# Patient Record
Sex: FEMALE | Race: WHITE | NOT HISPANIC OR LATINO
[De-identification: names, ages, dates, MRNs, and addresses within clinical notes are randomized per-mention and may not be internally consistent; named-entity substitution may affect disease eponyms.]

---

## 2018-08-12 ENCOUNTER — TRANSCRIPTION ENCOUNTER (OUTPATIENT)
Age: 73
End: 2018-08-12

## 2019-12-05 ENCOUNTER — TRANSCRIPTION ENCOUNTER (OUTPATIENT)
Age: 74
End: 2019-12-05

## 2020-06-20 ENCOUNTER — INPATIENT (INPATIENT)
Facility: HOSPITAL | Age: 75
LOS: 2 days | Discharge: HOME | End: 2020-06-23
Attending: INTERNAL MEDICINE | Admitting: INTERNAL MEDICINE
Payer: COMMERCIAL

## 2020-06-20 ENCOUNTER — TRANSCRIPTION ENCOUNTER (OUTPATIENT)
Age: 75
End: 2020-06-20

## 2020-06-20 VITALS
SYSTOLIC BLOOD PRESSURE: 144 MMHG | RESPIRATION RATE: 18 BRPM | DIASTOLIC BLOOD PRESSURE: 72 MMHG | HEART RATE: 77 BPM | OXYGEN SATURATION: 100 % | TEMPERATURE: 98 F

## 2020-06-20 DIAGNOSIS — Z29.9 ENCOUNTER FOR PROPHYLACTIC MEASURES, UNSPECIFIED: ICD-10-CM

## 2020-06-20 DIAGNOSIS — Z96.649 PRESENCE OF UNSPECIFIED ARTIFICIAL HIP JOINT: Chronic | ICD-10-CM

## 2020-06-20 DIAGNOSIS — L02.611 CUTANEOUS ABSCESS OF RIGHT FOOT: ICD-10-CM

## 2020-06-20 DIAGNOSIS — J30.2 OTHER SEASONAL ALLERGIC RHINITIS: ICD-10-CM

## 2020-06-20 DIAGNOSIS — D16.9 BENIGN NEOPLASM OF BONE AND ARTICULAR CARTILAGE, UNSPECIFIED: Chronic | ICD-10-CM

## 2020-06-20 LAB
ALBUMIN SERPL ELPH-MCNC: 4.1 G/DL — SIGNIFICANT CHANGE UP (ref 3.3–5)
ALP SERPL-CCNC: 98 U/L — SIGNIFICANT CHANGE UP (ref 40–120)
ALT FLD-CCNC: 23 U/L — SIGNIFICANT CHANGE UP (ref 4–33)
ANION GAP SERPL CALC-SCNC: 13 MMO/L — SIGNIFICANT CHANGE UP (ref 7–14)
AST SERPL-CCNC: 18 U/L — SIGNIFICANT CHANGE UP (ref 4–32)
BASE EXCESS BLDV CALC-SCNC: 1.6 MMOL/L — SIGNIFICANT CHANGE UP
BASOPHILS # BLD AUTO: 0.04 K/UL — SIGNIFICANT CHANGE UP (ref 0–0.2)
BASOPHILS NFR BLD AUTO: 0.6 % — SIGNIFICANT CHANGE UP (ref 0–2)
BILIRUB SERPL-MCNC: 0.3 MG/DL — SIGNIFICANT CHANGE UP (ref 0.2–1.2)
BLOOD GAS VENOUS - CREATININE: 0.9 MG/DL — SIGNIFICANT CHANGE UP (ref 0.5–1.3)
BUN SERPL-MCNC: 19 MG/DL — SIGNIFICANT CHANGE UP (ref 7–23)
CALCIUM SERPL-MCNC: 9.6 MG/DL — SIGNIFICANT CHANGE UP (ref 8.4–10.5)
CHLORIDE BLDV-SCNC: 108 MMOL/L — SIGNIFICANT CHANGE UP (ref 96–108)
CHLORIDE SERPL-SCNC: 104 MMOL/L — SIGNIFICANT CHANGE UP (ref 98–107)
CO2 SERPL-SCNC: 24 MMOL/L — SIGNIFICANT CHANGE UP (ref 22–31)
CREAT SERPL-MCNC: 0.97 MG/DL — SIGNIFICANT CHANGE UP (ref 0.5–1.3)
CRP SERPL-MCNC: 59 MG/L — HIGH
EOSINOPHIL # BLD AUTO: 0.21 K/UL — SIGNIFICANT CHANGE UP (ref 0–0.5)
EOSINOPHIL NFR BLD AUTO: 3 % — SIGNIFICANT CHANGE UP (ref 0–6)
ERYTHROCYTE [SEDIMENTATION RATE] IN BLOOD: 55 MM/HR — HIGH (ref 4–25)
GAS PNL BLDV: 141 MMOL/L — SIGNIFICANT CHANGE UP (ref 136–146)
GLUCOSE BLDV-MCNC: 115 MG/DL — HIGH (ref 70–99)
GLUCOSE SERPL-MCNC: 120 MG/DL — HIGH (ref 70–99)
HCO3 BLDV-SCNC: 24 MMOL/L — SIGNIFICANT CHANGE UP (ref 20–27)
HCT VFR BLD CALC: 40.8 % — SIGNIFICANT CHANGE UP (ref 34.5–45)
HCT VFR BLDV CALC: 42.3 % — SIGNIFICANT CHANGE UP (ref 34.5–45)
HGB BLD-MCNC: 13.1 G/DL — SIGNIFICANT CHANGE UP (ref 11.5–15.5)
HGB BLDV-MCNC: 13.8 G/DL — SIGNIFICANT CHANGE UP (ref 11.5–15.5)
IMM GRANULOCYTES NFR BLD AUTO: 0.4 % — SIGNIFICANT CHANGE UP (ref 0–1.5)
LACTATE BLDV-MCNC: 1.3 MMOL/L — SIGNIFICANT CHANGE UP (ref 0.5–2)
LYMPHOCYTES # BLD AUTO: 1.31 K/UL — SIGNIFICANT CHANGE UP (ref 1–3.3)
LYMPHOCYTES # BLD AUTO: 18.8 % — SIGNIFICANT CHANGE UP (ref 13–44)
MCHC RBC-ENTMCNC: 29.8 PG — SIGNIFICANT CHANGE UP (ref 27–34)
MCHC RBC-ENTMCNC: 32.1 % — SIGNIFICANT CHANGE UP (ref 32–36)
MCV RBC AUTO: 92.9 FL — SIGNIFICANT CHANGE UP (ref 80–100)
MONOCYTES # BLD AUTO: 0.5 K/UL — SIGNIFICANT CHANGE UP (ref 0–0.9)
MONOCYTES NFR BLD AUTO: 7.2 % — SIGNIFICANT CHANGE UP (ref 2–14)
NEUTROPHILS # BLD AUTO: 4.87 K/UL — SIGNIFICANT CHANGE UP (ref 1.8–7.4)
NEUTROPHILS NFR BLD AUTO: 70 % — SIGNIFICANT CHANGE UP (ref 43–77)
NRBC # FLD: 0 K/UL — SIGNIFICANT CHANGE UP (ref 0–0)
PCO2 BLDV: 48 MMHG — SIGNIFICANT CHANGE UP (ref 41–51)
PH BLDV: 7.36 PH — SIGNIFICANT CHANGE UP (ref 7.32–7.43)
PLATELET # BLD AUTO: 272 K/UL — SIGNIFICANT CHANGE UP (ref 150–400)
PMV BLD: 9.3 FL — SIGNIFICANT CHANGE UP (ref 7–13)
PO2 BLDV: < 24 MMHG — LOW (ref 35–40)
POTASSIUM BLDV-SCNC: 4.4 MMOL/L — SIGNIFICANT CHANGE UP (ref 3.4–4.5)
POTASSIUM SERPL-MCNC: 4.7 MMOL/L — SIGNIFICANT CHANGE UP (ref 3.5–5.3)
POTASSIUM SERPL-SCNC: 4.7 MMOL/L — SIGNIFICANT CHANGE UP (ref 3.5–5.3)
PROT SERPL-MCNC: 7 G/DL — SIGNIFICANT CHANGE UP (ref 6–8.3)
RBC # BLD: 4.39 M/UL — SIGNIFICANT CHANGE UP (ref 3.8–5.2)
RBC # FLD: 12.8 % — SIGNIFICANT CHANGE UP (ref 10.3–14.5)
SAO2 % BLDV: 30.1 % — LOW (ref 60–85)
SARS-COV-2 RNA SPEC QL NAA+PROBE: SIGNIFICANT CHANGE UP
SODIUM SERPL-SCNC: 141 MMOL/L — SIGNIFICANT CHANGE UP (ref 135–145)
WBC # BLD: 6.96 K/UL — SIGNIFICANT CHANGE UP (ref 3.8–10.5)
WBC # FLD AUTO: 6.96 K/UL — SIGNIFICANT CHANGE UP (ref 3.8–10.5)

## 2020-06-20 PROCEDURE — 99223 1ST HOSP IP/OBS HIGH 75: CPT

## 2020-06-20 PROCEDURE — 99284 EMERGENCY DEPT VISIT MOD MDM: CPT

## 2020-06-20 PROCEDURE — 73660 X-RAY EXAM OF TOE(S): CPT | Mod: 26,RT

## 2020-06-20 PROCEDURE — 73720 MRI LWR EXTREMITY W/O&W/DYE: CPT | Mod: 26,RT

## 2020-06-20 RX ORDER — ACETAMINOPHEN 500 MG
650 TABLET ORAL EVERY 6 HOURS
Refills: 0 | Status: DISCONTINUED | OUTPATIENT
Start: 2020-06-20 | End: 2020-06-23

## 2020-06-20 RX ORDER — ENOXAPARIN SODIUM 100 MG/ML
40 INJECTION SUBCUTANEOUS DAILY
Refills: 0 | Status: DISCONTINUED | OUTPATIENT
Start: 2020-06-20 | End: 2020-06-23

## 2020-06-20 RX ORDER — VANCOMYCIN HCL 1 G
1000 VIAL (EA) INTRAVENOUS EVERY 12 HOURS
Refills: 0 | Status: DISCONTINUED | OUTPATIENT
Start: 2020-06-21 | End: 2020-06-21

## 2020-06-20 RX ORDER — CADEXOMER IODINE 0.9 %
1 PADS, MEDICATED (EA) TOPICAL DAILY
Refills: 0 | Status: DISCONTINUED | OUTPATIENT
Start: 2020-06-20 | End: 2020-06-23

## 2020-06-20 RX ORDER — LORATADINE 10 MG/1
10 TABLET ORAL DAILY
Refills: 0 | Status: DISCONTINUED | OUTPATIENT
Start: 2020-06-20 | End: 2020-06-23

## 2020-06-20 RX ORDER — VANCOMYCIN HCL 1 G
1000 VIAL (EA) INTRAVENOUS ONCE
Refills: 0 | Status: COMPLETED | OUTPATIENT
Start: 2020-06-20 | End: 2020-06-20

## 2020-06-20 RX ORDER — ONDANSETRON 8 MG/1
4 TABLET, FILM COATED ORAL EVERY 6 HOURS
Refills: 0 | Status: DISCONTINUED | OUTPATIENT
Start: 2020-06-20 | End: 2020-06-23

## 2020-06-20 RX ORDER — PIPERACILLIN AND TAZOBACTAM 4; .5 G/20ML; G/20ML
3.38 INJECTION, POWDER, LYOPHILIZED, FOR SOLUTION INTRAVENOUS EVERY 8 HOURS
Refills: 0 | Status: DISCONTINUED | OUTPATIENT
Start: 2020-06-21 | End: 2020-06-23

## 2020-06-20 RX ORDER — HYDRALAZINE HCL 50 MG
25 TABLET ORAL ONCE
Refills: 0 | Status: COMPLETED | OUTPATIENT
Start: 2020-06-20 | End: 2020-06-20

## 2020-06-20 RX ORDER — PIPERACILLIN AND TAZOBACTAM 4; .5 G/20ML; G/20ML
3.38 INJECTION, POWDER, LYOPHILIZED, FOR SOLUTION INTRAVENOUS ONCE
Refills: 0 | Status: COMPLETED | OUTPATIENT
Start: 2020-06-20 | End: 2020-06-20

## 2020-06-20 RX ADMIN — Medication 25 MILLIGRAM(S): at 21:45

## 2020-06-20 RX ADMIN — PIPERACILLIN AND TAZOBACTAM 200 GRAM(S): 4; .5 INJECTION, POWDER, LYOPHILIZED, FOR SOLUTION INTRAVENOUS at 21:33

## 2020-06-20 NOTE — ED ADULT TRIAGE NOTE - CHIEF COMPLAINT QUOTE
sent in by podiatrist for worsening cellulitis of Right 3rd toe. noted redness and swelling to the affected extremity. Pt is on Augmenting, Bactrim with no improvement, denies fever, chills, COVID exposure.

## 2020-06-20 NOTE — CONSULT NOTE ADULT - SUBJECTIVE AND OBJECTIVE BOX
Attending:    Patient is a 74y old  Female who presents with a chief complaint of LF 3rd digit wound     HPI:  73 yo F, nonsmoker with no significant PMH, s/p I&D of right 3rd toe 6/18/20 by podiatry, sent in by urgent care for right 3rd toe infection. Pt states she had a mucoid cyst in right toe, ruptured 2 wks ago, became painful and swollen. Reports she saw podiatrist on Thursday, had I&D with large amount of purulent drainage, started on Augmentin w/ some improvement. Admits that her podiatrist is still concern about the appearance of the toe, seen at urgent care today, had xray performed and send to ED for further evaluation. Admits there's new streaking in the foot. Admits pain is mild. Denies any fever, chills, n/v/d, dizziness, chest pain, sob, weakness, numbness or any other complaints.    Review of systems negative except per HPI    PAST MEDICAL & SURGICAL HISTORY:  No pertinent past medical history  History of hip replacement, total    Home Medications:    Allergies    cefazolin (Unknown)  clindamycin (Diarrhea)  erythromycin (Unknown)  Keflex (Diarrhea)  Percocet (Unknown)  tetracycline (Unknown)    Intolerances      FAMILY HISTORY:    Social History:       LABS                        13.1   6.96  )-----------( 272      ( 20 Jun 2020 18:44 )             40.8     06-20    141  |  104  |  19  ----------------------------<  120<H>  4.7   |  24  |  0.97    Ca    9.6      20 Jun 2020 18:44    TPro  7.0  /  Alb  4.1  /  TBili  0.3  /  DBili  x   /  AST  18  /  ALT  23  /  AlkPhos  98  06-20      ESR: 55  CRP: --  06-20 @ 18:44    Vital Signs Last 24 Hrs  T(C): 36.5 (20 Jun 2020 17:43), Max: 36.5 (20 Jun 2020 17:43)  T(F): 97.7 (20 Jun 2020 17:43), Max: 97.7 (20 Jun 2020 17:43)  HR: 77 (20 Jun 2020 17:43) (77 - 77)  BP: 144/72 (20 Jun 2020 17:43) (144/72 - 144/72)  BP(mean): --  RR: 18 (20 Jun 2020 17:43) (18 - 18)  SpO2: 100% (20 Jun 2020 17:43) (100% - 100%)    PHYSICAL EXAM  General: NAD, AA0x3    Lower Extremity Focused:  Vasc: DP/PT 2/4 b/l, TG warm to warm RLE, warm to cool LLE, CFT < 3 sec x 10, edematous R forefoot  Neuro: Protective sensation intact to digits b/l   MSK: no structural abnormalities   Derm:    Wound #1:  Location: LF dorsal 3rd digit   Size: 1.0 cm x 0.5 cm   Etiology: ruptured/infected mucoid cyst   Depth: bone   Wound bed: granular   Drainage: scant purulence   Odor: none   Periwound: cellulitis and edematous     RADIOLOGY    Left foot XR prelim neg for subq gas or evidence of OM Attending:    Patient is a 74y old  Female who presents with a chief complaint of RF 3rd digit wound     HPI:  73 yo F, nonsmoker with no significant PMH, s/p I&D of right 3rd toe 6/18/20 by podiatry, sent in by urgent care for right 3rd toe infection. Pt states she had a mucoid cyst in right toe, ruptured 2 wks ago, became painful and swollen. Reports she saw podiatrist on Thursday, had I&D with large amount of purulent drainage, started on Augmentin w/ some improvement. Admits that her podiatrist is still concern about the appearance of the toe, seen at urgent care today, had xray performed and send to ED for further evaluation. Admits there's new streaking in the foot. Admits pain is mild. Denies any fever, chills, n/v/d, dizziness, chest pain, sob, weakness, numbness or any other complaints.    Review of systems negative except per HPI    PAST MEDICAL & SURGICAL HISTORY:  No pertinent past medical history  History of hip replacement, total    Home Medications:    Allergies    cefazolin (Unknown)  clindamycin (Diarrhea)  erythromycin (Unknown)  Keflex (Diarrhea)  Percocet (Unknown)  tetracycline (Unknown)    Intolerances      FAMILY HISTORY:    Social History:       LABS                        13.1   6.96  )-----------( 272      ( 20 Jun 2020 18:44 )             40.8     06-20    141  |  104  |  19  ----------------------------<  120<H>  4.7   |  24  |  0.97    Ca    9.6      20 Jun 2020 18:44    TPro  7.0  /  Alb  4.1  /  TBili  0.3  /  DBili  x   /  AST  18  /  ALT  23  /  AlkPhos  98  06-20      ESR: 55  CRP: --  06-20 @ 18:44    Vital Signs Last 24 Hrs  T(C): 36.5 (20 Jun 2020 17:43), Max: 36.5 (20 Jun 2020 17:43)  T(F): 97.7 (20 Jun 2020 17:43), Max: 97.7 (20 Jun 2020 17:43)  HR: 77 (20 Jun 2020 17:43) (77 - 77)  BP: 144/72 (20 Jun 2020 17:43) (144/72 - 144/72)  BP(mean): --  RR: 18 (20 Jun 2020 17:43) (18 - 18)  SpO2: 100% (20 Jun 2020 17:43) (100% - 100%)    PHYSICAL EXAM  General: NAD, AA0x3    Lower Extremity Focused:  Vasc: DP/PT 2/4 b/l, TG warm to warm RLE, warm to cool LLE, CFT < 3 sec x 10, edematous R forefoot  Neuro: Protective sensation intact to digits b/l   MSK: no structural abnormalities   Derm:    Wound #1:  Location: RF dorsal 3rd digit   Size: 1.0 cm x 0.5 cm   Etiology: ruptured/infected mucoid cyst   Depth: bone   Wound bed: granular   Drainage: scant purulence   Odor: none   Periwound: cellulitis and edematous     RADIOLOGY    Right foot XR prelim neg for subq gas or evidence of OM

## 2020-06-20 NOTE — H&P ADULT - NSHPPHYSICALEXAM_GEN_ALL_CORE
Physical exam:  Vital signs reviewed as below:  T(C): 36.5 (06-20-20 @ 17:43), Max: 36.5 (06-20-20 @ 17:43)  HR: 77 (06-20-20 @ 17:43) (77 - 77)  BP: 144/72 (06-20-20 @ 17:43) (144/72 - 144/72)  RR: 18 (06-20-20 @ 17:43) (18 - 18)  SpO2: 100% (06-20-20 @ 17:43) (100% - 100%)  Constitutional-awake, alert, not in acute distress  Neuro-awake, alert, appropriately interactive, moving all extremities,  face symmetric  Eyes-pupils equally round and reactive to light, non icteric, no discharge noted  Ears, nose, mouth, throat-no abnormal discharge, moist mucous membranes, oropharynx clear without noted exudate  CV-regular rate and rhythm, no rubs, murmurs, gallops appreciated, no lower extremity edema  Resp-clear to auscultation bilaterally, normal respiratory effort,   GI-abdomen soft and nontender, no rebound or guarding present  -not examined  MSK-normal range of motion of bilateral elbows and knees  Skin-warm, dry, erythema of R anterior shin with border marked already.  R foot wrapped after I&D and in boot.  Patient wished to not unwrap foot again.  Psych-calm, cooperative, normal affect, not attending to internal stimuli

## 2020-06-20 NOTE — ED ADULT NURSE NOTE - OBJECTIVE STATEMENT
Received pt in intake 9, pt A&Ox4, respirations even and unlabored b/l. Pt sent to ED by podiatrist for cellulitis on R. foot 3rd digit. Denies any injury. States "it was a ruptured mucous cyst." Denies fever, chills, body aches. Swelling, redness noted on R. foot 3rd digit. IVL 20g Angiocath placed on right AC. Labs sent. MD Thomas at bedside for eval. Will continue to monitor. Received pt in intake 9, pt A&Ox4, respirations even and unlabored b/l. Pt sent to ED by podiatrist for cellulitis on R. foot 3rd digit. Denies any injury. States "it was a ruptured mucoid cyst." Also reports recent I&D to cyst. Denies fever, chills, body aches. Swelling, redness noted on R. foot 3rd digit, no active drainage at this time.. IVL 20g Angiocath placed on right AC. Labs sent. MD Thomas at bedside for eval. Will continue to monitor. Received pt in intake 9, pt A&Ox4, respirations even and unlabored b/l. Pt sent to ED by podiatrist for cellulitis on R. foot 3rd digit. Denies any injury. States "it was a ruptured mucoid cyst." Also reports recent I&D to cyst. Denies fever, chills, body aches. Open wound with swelling, redness noted on R. foot 3rd digit, no active drainage at this time.. IVL 20g Angiocath placed on right AC. Labs sent. MD Thomas at bedside for eval. Will continue to monitor.

## 2020-06-20 NOTE — H&P ADULT - NSHPREVIEWOFSYSTEMS_GEN_ALL_CORE
ROS:  Constitutional:  (+) none; (-) fevers, chills, sweats, unintentional weight loss, fatigue, sick contacts, recent travel, trauma  Ears/Nose/Mouth/Throat: (+) none; (-) throat pain, rhinorrhea, dysphagia/odynophagia  CV: (+) none; (-) chest pain, palpitations, lower extremity edema, orthopnea, PND  Resp: (+) none; (-) shortness of breath, cough  GI: (+) none; (-) abdominal pain, nausea, vomiting, diarrhea, constipation, melana, hematochezia  : (+) none; (-) dysuria, hematuria  MSK: (+) none; (-) joint pain, joint swelling  Skin: (+) R foot and shin redness, swelling, discharge as above (-)  new yellowing/darkening of skin  Neuro: (+) none; (-) HA, vision changes, weakness, confusion, lightheadedness/dizziness  Endo: (+) none; (-) heat/cold intolerance, recent skin/hair/nail changes  Heme/Lymph: (+) none; (-) swollen lymph nodes, night sweats

## 2020-06-20 NOTE — ED ADULT NURSE REASSESSMENT NOTE - NS ED NURSE REASSESS COMMENT FT1
Pt. A&OX4. Respirations even and unlabored. VS as noted. MD Cook notified of pt.'s BP. Pt. asymptomatic and denies any dizziness, HA, or lightheadedness. Awaiting further orders. Will continue to monitor.

## 2020-06-20 NOTE — ED PROVIDER NOTE - LOWER EXTREMITY EXAM, LEFT
good ROM; left 3rd toe with generalized erythema, edema, fluctuant, red streaking at metatarsal area, sensation intact, no crepitus, +2 DP pulse.

## 2020-06-20 NOTE — H&P ADULT - ASSESSMENT
74 year old woman with a history of gestational diabetes, seasonal allergies who presents with R 3rd toe abscess.

## 2020-06-20 NOTE — ED PROVIDER NOTE - CLINICAL SUMMARY MEDICAL DECISION MAKING FREE TEXT BOX
75 yo F, nonsmoker with no significant PMH, s/p I&D of left 3rd toe 6/18/20 by podiatry, sent in by urgent care for left 3rd toe infection. well appearing female p/w infected left 3rd toe w/ streaking, currently taking Augmentin, concern for cellulitis. Plan: labs, VBG, blood culture, esr/crp, imaging (done at [facility] urgent care), pain control, start IV abx, podiatry consult. 75 yo F, nonsmoker with no significant PMH, s/p I&D of right 3rd toe 6/18/20 by podiatry, sent in by urgent care for right 3rd toe infection. well appearing female p/w infected right 3rd toe w/ streaking, currently taking Augmentin, concern for cellulitis. Plan: labs, VBG, blood culture, esr/crp, imaging (done at [facility] urgent care), pain control, start IV abx, podiatry consult.

## 2020-06-20 NOTE — H&P ADULT - PROBLEM SELECTOR PLAN 1
-Concern for osteomyelitis given probes to bone on podiatry evaluation  -Obtain MR foot with contrast to assess further  -Trend ESR/CRP  -Empiric vanc/zosyn pending culture data  -Podiatry following, appreciate recs  -Obtain screening EKG.

## 2020-06-20 NOTE — ED PROVIDER NOTE - ATTENDING CONTRIBUTION TO CARE
Attending note:   After face to face evaluation of this patient, I concur with above noted hx, pe, and care plan for this patient.  Thomas: 74 yof with a "mucoid cyst" drained 4 days ago with pus removed. Pt then placed on Augmentin and them Bactrim added yesterday. Pt noted initial improvement but then worsened with redness on foot. spreading. No fever. On exam, pt is well appearing, no distress, clear lungs, normal RRR, right foot 3rd toe with open wound on dorum of distal phalanx with yellowish discoloration over lateral aspect with intact overlying skin, dark red streak up the foot with large area of erythema and warmth on anterior tib/fib area. Concerning for worsening infection on 2 different antibiotics and now with lymphangitic and cellulitis spread - IV antibxs, labs, possible id consult with admission.

## 2020-06-20 NOTE — H&P ADULT - HISTORY OF PRESENT ILLNESS
Nelly Monroy is a 74 year old woman with a history of gestational diabetes, seasonal allergies who presents with R 3rd toe abscess.    She first noticed a problem on 6/16 when she believes a mucoid cyst that had been present and ruptured previously became painful, swollen, and started to have purulent discharge.  The pain became severe on 6/17 requiring tramadol and tylenol, but she denies any fevers, chills, or numbness/tingling.  She contacted her podiatrist who was able to see her on Thursday and perform an I&D.  She was then started on augmentin and bactroban.  Although the pain did not significantly worsen after the procedure, the redness and swelling persisted and she had some redness extending up her right shin.  Due to this, she presented to an urgent care and was then directed to [facility] ED for evaluation.    In the ED, she was afebrile with unremarkable vital signs.  Diagnostics revealed an elevated ESR and CRP.  Podiatry evaluated and performed a bedside I&D and recommended abx.  She was started on vanc and zosyn and admitted for further management.    On evaluation, she gave the above history and denied any current pain.

## 2020-06-20 NOTE — ED PROVIDER NOTE - LOWER EXTREMITY EXAM, RIGHT
R 3rd toe with generalized erythema, edema, fluctuant anterior lateral aspect of toe, no active drainage, sensation intact, no crepitus, red streaking at mid metatarsal area R 3rd toe with generalized erythema, edema, fluctuant anterior lateral aspect of toe, no active drainage, sensation intact, no crepitus, red streaking at mid metatarsal area, mild erythema of anterior medial aspect of tibialis

## 2020-06-20 NOTE — ED PROVIDER NOTE - OBJECTIVE STATEMENT
73 yo F, nonsmoker with no significant PMH, s/p I&D of left 3rd toe 6/18/20 by podiatry, sent in by urgent care for left 3rd toe infection. Pt states she had a mucoid cyst in left toe, ruptured 2 wks ago, became painful and swollen. Reports she saw podiatrist on Thursday, had I&D with large amount of purulent drainage, started on Augmentin w/ some improvement. Admits that her podiatrist is still concern about the appearance of the toe, seen at urgent care today, had xray performed and send to ED for further evaluation. Admits there's new streaking in the foot. Admits pain is mild. Denies any fever, chills, n/v/d, dizziness, chest pain, sob, weakness, numbness or any other complaints. 73 yo F, nonsmoker with no significant PMH, s/p I&D of right 3rd toe 6/18/20 by podiatry, sent in by urgent care for right 3rd toe infection. Pt states she had a mucoid cyst in right toe, ruptured 2 wks ago, became painful and swollen. Reports she saw podiatrist on Thursday, had I&D with large amount of purulent drainage, started on Augmentin w/ some improvement. Admits that her podiatrist is still concern about the appearance of the toe, seen at urgent care today, had xray performed and send to ED for further evaluation. Admits there's new streaking in the foot. Admits pain is mild. Denies any fever, chills, n/v/d, dizziness, chest pain, sob, weakness, numbness or any other complaints.

## 2020-06-20 NOTE — ED PROVIDER NOTE - PROGRESS NOTE DETAILS
PA YARELI: pt being evaluated by podiatry at bedside, recommended broad spectrum IV antibiotic. Zosyn & Yvette ordered. [facility] hospitalist paged through answering service. Will continue to monitor and reassess. CRYS DOWNS: Spoke with Dr. Abrams, accepted patient for admission. Pt admitted for R 3rd toe abscess/cellulitis.

## 2020-06-20 NOTE — CONSULT NOTE ADULT - ASSESSMENT
75 y/o F w/ LF 3rd digit wound w/ cellulitis to mid tibia     - pt seen and evaluated  - afebrile, no leukocytosis, ESR 55, CRP 56  - L foot XR prelim neg for subq gas or OM  - L foot dorsal 3rd digit wound to bone w/ cellulitis/ lymphangitis to mid tibia, scant purulence, no malodor, no crepitation   - digital block administered utilizing 4 cc 1% lidocaine plain  - excisional debridement L foot 3rd digit down to subq and not beyond utilizing sterile #15 blade  - wound explored, scant purulence, no evidence of tracking   - wound cultured   - no acute pod sx intervention   - rec admit to medicine for empiric IV ABx - Vanco/Zosyn  - rec ID c/s  - ordered RF MR to r/o OM or abscess   - discussed w/ pt concern for viability of digit as well as high concern for OM since wound probes to bone. Will likely need a partial 3rd ray resection  - please begin to medically optimize pt for OR  - final pod plan pending MR  - discussed w/ attending 75 y/o F w/ R foot 3rd digit wound w/ cellulitis to mid tibia     - pt seen and evaluated  - afebrile, no leukocytosis, ESR 55, CRP 56  - L foot XR prelim neg for subq gas or OM  - L foot dorsal 3rd digit wound to bone w/ cellulitis/ lymphangitis to mid tibia, scant purulence, no malodor, no crepitation   - digital block administered utilizing 4 cc 1% lidocaine plain  - excisional debridement L foot 3rd digit down to subq and not beyond utilizing sterile #15 blade  - wound explored, scant purulence, no evidence of tracking   - wound cultured   - no acute pod sx intervention   - rec admit to medicine for empiric IV ABx - Vanco/Zosyn  - rec ID c/s  - ordered RF MR to r/o OM or abscess   - discussed w/ pt concern for viability of digit as well as high concern for OM since wound probes to bone. Will likely need a partial 3rd ray resection  - please begin to medically optimize pt for OR  - final pod plan pending MR  - discussed w/ attending 75 y/o F w/ R foot 3rd digit wound w/ cellulitis to mid tibia     - pt seen and evaluated  - afebrile, no leukocytosis, ESR 55, CRP 56  - R foot XR prelim neg for subq gas or OM  - R foot dorsal 3rd digit wound to bone w/ cellulitis/ lymphangitis to mid tibia, scant purulence, no malodor, no crepitation   - digital block administered utilizing 4 cc 1% lidocaine plain  - excisional debridement L foot 3rd digit down to subq and not beyond utilizing sterile #15 blade  - wound explored, scant purulence, no evidence of tracking   - wound cultured   - no acute pod sx intervention   - rec admit to medicine for empiric IV ABx - Vanco/Zosyn  - rec ID c/s  - ordered RF MR to r/o OM or abscess   - discussed w/ pt concern for viability of digit as well as high concern for OM since wound probes to bone. Will likely need a partial 3rd ray resection  - please begin to medically optimize pt for OR  - final pod plan pending MR  - discussed w/ attending 73 y/o F w/ R foot 3rd digit wound w/ cellulitis to mid tibia     - pt seen and evaluated  - afebrile, no leukocytosis, ESR 55, CRP 56  - R foot XR prelim neg for subq gas or OM  - R foot dorsal 3rd digit wound to bone w/ cellulitis/ lymphangitis to mid tibia, scant purulence, no malodor, no crepitation   - digital block administered utilizing 4 cc 1% lidocaine plain  - excisional debridement R foot 3rd digit down to subq and not beyond utilizing sterile #15 blade  - wound explored, scant purulence, no evidence of tracking   - wound cultured   - no acute pod sx intervention   - rec admit to medicine for empiric IV ABx - Vanco/Zosyn  - rec ID c/s  - ordered RF MR to r/o OM or abscess   - discussed w/ pt concern for viability of digit as well as high concern for OM since wound probes to bone. Will likely need a partial 3rd ray resection  - please begin to medically optimize pt for OR  - final pod plan pending MR  - discussed w/ attending

## 2020-06-21 ENCOUNTER — TRANSCRIPTION ENCOUNTER (OUTPATIENT)
Age: 75
End: 2020-06-21

## 2020-06-21 DIAGNOSIS — L03.115 CELLULITIS OF RIGHT LOWER LIMB: ICD-10-CM

## 2020-06-21 LAB
ANION GAP SERPL CALC-SCNC: 15 MMO/L — HIGH (ref 7–14)
BUN SERPL-MCNC: 14 MG/DL — SIGNIFICANT CHANGE UP (ref 7–23)
CALCIUM SERPL-MCNC: 8.7 MG/DL — SIGNIFICANT CHANGE UP (ref 8.4–10.5)
CHLORIDE SERPL-SCNC: 105 MMOL/L — SIGNIFICANT CHANGE UP (ref 98–107)
CO2 SERPL-SCNC: 21 MMOL/L — LOW (ref 22–31)
CREAT SERPL-MCNC: 0.77 MG/DL — SIGNIFICANT CHANGE UP (ref 0.5–1.3)
CRP SERPL-MCNC: 39 MG/L — HIGH
ERYTHROCYTE [SEDIMENTATION RATE] IN BLOOD: 51 MM/HR — HIGH (ref 4–25)
GLUCOSE SERPL-MCNC: 129 MG/DL — HIGH (ref 70–99)
HCT VFR BLD CALC: 36.7 % — SIGNIFICANT CHANGE UP (ref 34.5–45)
HGB BLD-MCNC: 12.6 G/DL — SIGNIFICANT CHANGE UP (ref 11.5–15.5)
MCHC RBC-ENTMCNC: 31.5 PG — SIGNIFICANT CHANGE UP (ref 27–34)
MCHC RBC-ENTMCNC: 34.3 % — SIGNIFICANT CHANGE UP (ref 32–36)
MCV RBC AUTO: 91.8 FL — SIGNIFICANT CHANGE UP (ref 80–100)
NRBC # FLD: 0 K/UL — SIGNIFICANT CHANGE UP (ref 0–0)
PLATELET # BLD AUTO: 260 K/UL — SIGNIFICANT CHANGE UP (ref 150–400)
PMV BLD: 9.6 FL — SIGNIFICANT CHANGE UP (ref 7–13)
POTASSIUM SERPL-MCNC: 4.3 MMOL/L — SIGNIFICANT CHANGE UP (ref 3.5–5.3)
POTASSIUM SERPL-SCNC: 4.3 MMOL/L — SIGNIFICANT CHANGE UP (ref 3.5–5.3)
RBC # BLD: 4 M/UL — SIGNIFICANT CHANGE UP (ref 3.8–5.2)
RBC # FLD: 12.8 % — SIGNIFICANT CHANGE UP (ref 10.3–14.5)
SODIUM SERPL-SCNC: 141 MMOL/L — SIGNIFICANT CHANGE UP (ref 135–145)
WBC # BLD: 6.27 K/UL — SIGNIFICANT CHANGE UP (ref 3.8–10.5)
WBC # FLD AUTO: 6.27 K/UL — SIGNIFICANT CHANGE UP (ref 3.8–10.5)

## 2020-06-21 RX ORDER — VANCOMYCIN HCL 1 G
1250 VIAL (EA) INTRAVENOUS EVERY 12 HOURS
Refills: 0 | Status: DISCONTINUED | OUTPATIENT
Start: 2020-06-21 | End: 2020-06-23

## 2020-06-21 RX ADMIN — Medication 1 APPLICATION(S): at 17:40

## 2020-06-21 RX ADMIN — Medication 250 MILLIGRAM(S): at 01:36

## 2020-06-21 RX ADMIN — PIPERACILLIN AND TAZOBACTAM 25 GRAM(S): 4; .5 INJECTION, POWDER, LYOPHILIZED, FOR SOLUTION INTRAVENOUS at 13:00

## 2020-06-21 RX ADMIN — PIPERACILLIN AND TAZOBACTAM 25 GRAM(S): 4; .5 INJECTION, POWDER, LYOPHILIZED, FOR SOLUTION INTRAVENOUS at 20:40

## 2020-06-21 RX ADMIN — ENOXAPARIN SODIUM 40 MILLIGRAM(S): 100 INJECTION SUBCUTANEOUS at 13:00

## 2020-06-21 RX ADMIN — PIPERACILLIN AND TAZOBACTAM 25 GRAM(S): 4; .5 INJECTION, POWDER, LYOPHILIZED, FOR SOLUTION INTRAVENOUS at 05:28

## 2020-06-21 RX ADMIN — Medication 650 MILLIGRAM(S): at 17:38

## 2020-06-21 RX ADMIN — LORATADINE 10 MILLIGRAM(S): 10 TABLET ORAL at 20:39

## 2020-06-21 RX ADMIN — Medication 166.67 MILLIGRAM(S): at 13:00

## 2020-06-21 NOTE — DISCHARGE NOTE PROVIDER - NSDCMRMEDTOKEN_GEN_ALL_CORE_FT
Claritin 10 mg oral tablet: 1 tab(s) orally once a day Claritin 10 mg oral tablet: 1 tab(s) orally once a day  glucometer : alcohol swabs : Apply topically to affected area 4 times a day   Claritin 10 mg oral tablet: 1 tab(s) orally once a day  glucometer (per patient&#x27;s insurance): Test blood sugars four times a day. Dispense #1 glucometer.  lancets: 1 application subcutaneously 4 times a day   test strips (per patient&#x27;s insurance): 1 application subcutaneously 4 times a day. ** Compatible with patient&#x27;s glucometer ** alcohol swabs : Apply topically to affected area 3 times a day     E 11.9  Claritin 10 mg oral tablet: 1 tab(s) orally once a day  glucometer (per patient&#x27;s insurance): 1 application subcutaneous 3 times a day   lancets: 1 application subcutaneously 3 times a day   test strips (per patient&#x27;s insurance): 1 application subcutaneously 3 times a day . ** Compatible with patient&#x27;s glucometer **     E 11.9 alcohol swabs : Apply topically to affected area 3 times a day     E 11.9  Claritin 10 mg oral tablet: 1 tab(s) orally once a day  glucometer (per patient&#x27;s insurance): 1 application subcutaneous 3 times a day     E11.9  glucometer (per patient&#x27;s insurance): 1 application subcutaneous 3 times a day   lancets: 1 application subcutaneously 3 times a day     E 11.9  test strips (per patient&#x27;s insurance): 1 application subcutaneously 3 times a day . ** Compatible with patient&#x27;s glucometer **     E 11.9 alcohol swabs : Apply topically to affected area 3 times a day     E 11.9  amoxicillin-clavulanate 875 mg-125 mg oral tablet: 1 tab(s) orally 2 times a day  Claritin 10 mg oral tablet: 1 tab(s) orally once a day  glucometer (per patient&#x27;s insurance): 1 application subcutaneous 3 times a day     E11.9  lancets: 1 application subcutaneously 3 times a day     E 11.9  test strips (per patient&#x27;s insurance): 1 application subcutaneously 3 times a day . ** Compatible with patient&#x27;s glucometer **     E 11.9

## 2020-06-21 NOTE — DISCHARGE NOTE PROVIDER - CARE PROVIDERS DIRECT ADDRESSES
,DirectAddress_Unknown,[email or web address]

## 2020-06-21 NOTE — CONSULT NOTE ADULT - ASSESSMENT
Nelly Monroy is a 74 year old woman with a history of gestational diabetes, seasonal allergies who presents with R 3rd toe abscess.    She first noticed a problem on 6/16 when she believes a mucoid cyst that had been present and ruptured previously became painful, swollen, and started to have purulent discharge.  The pain became severe on 6/17 requiring tramadol and tylenol, but she denies any fevers, chills, or numbness/tingling.  She contacted her podiatrist who was able to see her on Thursday and perform an I&D.  She was then started on augmentin and bactroban.  Although the pain did not significantly worsen after the procedure, the redness and swelling persisted and she had some redness extending up her right shin.  Due to this, she presented to an urgent care and was then directed to [facility] ED for evaluation.    In the ED, she was afebrile with unremarkable vital signs.  Diagnostics revealed an elevated ESR and CRP.  Podiatry evaluated and performed a bedside I&D and recommended abx.  She was started on vanc and zosyn and admitted for further management.    On evaluation, she gave the above history and denied any current pain. (20 Jun 2020 21:00)    ER pt afebrile.  WBC normal.  ESR 55.  CRP 59.  Covid pcr (-).  MRI foot shows cellulitis without abcess or OM.   However wound probes to bone as per Podiatry exam, pt planned for possible 3rd ray resection.  ID consulted for further abx management.     Rt ft OM/cellulitis:    - 3rd digit infected mucoid cyst, s/p I&D as outpt.  Seen by podiatry, s/p debridement, (+) PTB - likely OM on clinical basis although MRI does not report bone involvement, likely due to acute presentation.  Concern for viability, and pt for possible amputation.  F/u with podiatry.    - Trend inflammatory markers.    - Cont vanco/zosyn.  f/u trough.    - f/u bcx, wound cx    - Check HgA1c      (Dr. Galindo to resume care on 6/22 onwards)    - Con

## 2020-06-21 NOTE — DISCHARGE NOTE PROVIDER - HOSPITAL COURSE
74 year old woman with a history of gestational diabetes, seasonal allergies who presents with R 3rd toe abscess and rt foot/foreleg cellulitis.        1.Abscess of toe, right.  Plan: -Appreciate podiatric intervention and local wound care    -R foot XR and MR neg for subq gas or OM    - R foot dorsal 3rd digit wound to bone w/ cellulitis/ lymphangitis to mid tibia, scant purulence, no malodor, no crepitation     - w/ neg MR for OM/ abscess and significant clinical improvement in cellulitis and swelling of R foot    -Empiric IV vanc/zosyn pending culture data, no acute intervention/ amputation    - Podiatry consulted:    Wound #1:    Location: RF dorsal 3rd digit     Size: 1.0 cm x 0.5 cm     Etiology: ruptured/infected mucoid cyst     Depth: bone     Wound bed: granular     Drainage: scant purulence     Odor: none     Periwound: cellulitis and edematous         2.Cellulitis of leg, right.      -Clinically improved    - treated with IV antibiotics        3. T2DM    newly diagnosed    hgb1ac 6.7    FS ACHS and ISS        Disccused with       , cleared for DC on     . Medications reviewed with patient. Medications sent to patient's preferred pharmacy. This is a 74 year old woman with a history of gestational diabetes, seasonal allergies who presented to [facility] with Right foot 3rd toe abscess s/p rupture of infected mucoid cyst with and right  foot/foreleg cellulitis. Podiatry evaluated and performed a bedside I&D, cultures obtained and initiated empiric IV antibiotics vancomycin and zosyn, and admitted for further management. MRI results of right leg/right foot show soft tissue ulceration/wound at the dorsal aspect of the distal third digit with marked adjacent soft tissue edema and enhancement on postcontrast imaging. There is no soft tissue abscess identified, no enhancement within the phalanges of the third digit to suggest the presence of osteomyelitis,  no fracture identified. Dorsal subcutaneous soft tissue edema is seen throughout the midfoot and forefoot compatible with cellulitis.    Wound cultures with staph pyogenes, blood cultures negative .I D consulted and following. A1C 6.7. Patient educated and instructed by staff on performing finger stick glucose monitoring with understanding verbalized. Right leg and foot swelling with cellulitis improved. Transitioned to oral antibiotics augmentin , until 6/30/20 as per ID recommendations. Afebrile, vital signs stable. Tolerating diet, and pain well controlled. Patient is medically stable and ready for discharge.    Dispo: Patient case reviewed and discussed with attending physician Dr Abrams who agrees the patient is medically stable and optimized for discharge on 6/23/20 on oral augmentin until 6/30/20, to monitor FSG AC and HS, with with follow up with primary care physician, and with podiatrist within 1 week following discharge . All medications were reviewed and prescriptions were sent to mutually agreed upon pharmacy. This is a 74 year old woman with a history of gestational diabetes, seasonal allergies who presented to [facility] with Right foot 3rd toe abscess s/p rupture of infected mucoid cyst with and right  foot/foreleg cellulitis. Podiatry evaluated and performed a bedside I&D, cultures obtained and initiated empiric IV antibiotics vancomycin and zosyn, and admitted for further management. MRI results of right leg/right foot show soft tissue ulceration/wound at the dorsal aspect of the distal third digit with marked adjacent soft tissue edema and enhancement on postcontrast imaging. There is no soft tissue abscess identified, no enhancement within the phalanges of the third digit to suggest the presence of osteomyelitis,  no fracture identified. Dorsal subcutaneous soft tissue edema is seen throughout the midfoot and forefoot compatible with cellulitis.    Wound cultures with staph pyogenes, blood cultures negative .I D consulted and following. A1C 6.7. Patient educated and instructed by staff on performing finger stick glucose monitoring with understanding verbalized. Right leg and foot swelling with cellulitis improved. Transitioned to oral antibiotics augmentin , until 6/30/20 as per ID recommendations. Afebrile, vital signs stable. Tolerating diet, and pain well controlled. Patient is medically stable and ready for discharge.    Dispo: Patient case reviewed and discussed with attending physician Dr Abrams who agrees the patient is medically stable and optimized for discharge on 6/23/20 on oral augmentin until 6/30/20, to monitor FSG AC and HS, continue with wound care as directed by podiatrist , with follow up with primary care physician, and with podiatrist within 1 week following discharge. Patient acknowledged and verbalized her understanding. All medications were reviewed and prescriptions were sent to mutually agreed upon pharmacy. This is a 74 year old woman with a history of gestational diabetes, seasonal allergies who presented to [facility] with Right foot 3rd toe abscess s/p rupture of infected mucoid cyst with and right  foot/foreleg cellulitis. Podiatry evaluated and performed a bedside I&D, cultures obtained and initiated empiric IV antibiotics vancomycin and zosyn, and admitted for further management. MRI results of right leg/right foot show soft tissue ulceration/wound at the dorsal aspect of the distal third digit with marked adjacent soft tissue edema and enhancement on postcontrast imaging. There is no soft tissue abscess identified, no enhancement within the phalanges of the third digit to suggest the presence of osteomyelitis,  no fracture identified. Dorsal subcutaneous soft tissue edema is seen throughout the midfoot and forefoot compatible with cellulitis.    Wound cultures with staph pyogenes, blood cultures negative .I D consulted and following. A1C 6.7. FSG remains stable in the 120 range. Patient educated and instructed by staff on performing finger stick glucose monitoring with understanding verbalized. Right leg and foot swelling with cellulitis improved. Transitioned to oral antibiotics augmentin , until 6/30/20 as per ID recommendations. Afebrile, vital signs stable. Tolerating diet, and pain well controlled. Patient is medically stable and ready for discharge.    Dispo: Patient case reviewed and discussed with attending physician Dr Abrams who agrees the patient is medically stable and optimized for discharge on 6/23/20 on oral augmentin until 6/30/20, to monitor FSG AC and HS, continue with wound care as directed by podiatrist , with follow up with primary care physician, and with podiatrist within 1 week following discharge. Patient acknowledged and verbalized her understanding. All medications were reviewed and prescriptions were sent to mutually agreed upon pharmacy.

## 2020-06-21 NOTE — PROGRESS NOTE ADULT - ASSESSMENT
74 year old woman with a history of gestational diabetes, seasonal allergies who presents with R 3rd toe abscess and rt foot/foreleg cellulitis.

## 2020-06-21 NOTE — PROGRESS NOTE ADULT - PROBLEM SELECTOR PLAN 2
-Clinically improved  -Monitor WBC  -Cont IV antibiotics  -Monitor culture data  -Rt LE elevation in bed

## 2020-06-21 NOTE — PROGRESS NOTE ADULT - PROBLEM SELECTOR PLAN 1
-Appreciate podiatric intervention and local wound care  -R foot XR and MR neg for subq gas or OM  -Trend ESR/CRP  -Empiric vanc/zosyn pending culture data  -Appreciate ID

## 2020-06-21 NOTE — PROGRESS NOTE ADULT - SUBJECTIVE AND OBJECTIVE BOX
S/p I+D last evening  Minimal pain    Vital Signs Last 24 Hrs  T(C): 36.8 (21 Jun 2020 05:32), Max: 36.8 (20 Jun 2020 21:38)  T(F): 98.2 (21 Jun 2020 05:32), Max: 98.3 (20 Jun 2020 21:38)  HR: 69 (21 Jun 2020 05:32) (69 - 77)  BP: 140/70 (21 Jun 2020 05:32) (140/70 - 183/90)  BP(mean): --  RR: 18 (21 Jun 2020 05:32) (17 - 18)  SpO2: 95% (21 Jun 2020 05:32) (95% - 100%)    I&O's Summary    06-20-20 @ 07:01  -  06-21-20 @ 07:00  --------------------------------------------------------  IN: 275 mL / OUT: 0 mL / NET: 275 mL    06-21-20 @ 07:01  -  06-21-20 @ 13:47  --------------------------------------------------------  IN: 550 mL / OUT: 0 mL / NET: 550 mL      	    Constitutional-awake, alert, not in acute distress  Neuro-awake, alert, appropriately interactive, moving all extremities,  face symmetric  Eyes-pupils equally round and reactive to light, non icteric, no discharge noted  Ears, nose, mouth, throat-no abnormal discharge, moist mucous membranes, oropharynx clear without noted exudate  CV-regular rate and rhythm, no rubs, murmurs, gallops appreciated, no lower extremity edema  Resp-clear to auscultation bilaterally, normal respiratory effort,   GI-abdomen soft and nontender, no rebound or guarding present  MSK-normal range of motion of bilateral elbows and knees  Skin-rt anterior foreleg erythema improved,  R foot wrapped after I&D and in boot.   Psych-calm, cooperative, normal affect, not attending to internal stimuli    LABS:                        12.6   6.27  )-----------( 260      ( 21 Jun 2020 05:28 )             36.7     06-21    141  |  105  |  14  ----------------------------<  129<H>  4.3   |  21<L>  |  0.77    Ca    8.7      21 Jun 2020 06:00    TPro  7.0  /  Alb  4.1  /  TBili  0.3  /  DBili  x   /  AST  18  /  ALT  23  /  AlkPhos  98  06-20      CAPILLARY BLOOD GLUCOSE                RADIOLOGY & ADDITIONAL TESTS:    Imaging Personally Reviewed:  [x] YES  [ ] NO    Consultant(s) Notes Reviewed:  [x] YES  [ ] NO

## 2020-06-21 NOTE — DISCHARGE NOTE PROVIDER - PROVIDER TOKENS
PROVIDER:[TOKEN:[[id number]],FOLLOWUP:[1 week],ESTABLISHEDPATIENT:[T]],PROVIDER:[TOKEN:[[id number]],FOLLOWUP:[1 week],ESTABLISHEDPATIENT:[T]]

## 2020-06-21 NOTE — DISCHARGE NOTE PROVIDER - NSDCCPCAREPLAN_GEN_ALL_CORE_FT
PRINCIPAL DISCHARGE DIAGNOSIS  Diagnosis: Abscess of toe, right  Assessment and Plan of Treatment: You had an abscess on you 3rd toe of your right foot and podiatry drained it. Fluid cultures revealed an infection, you were seen by Infectious Disease Physician and treated with IV antibiotics with improvement of symptoms. Infectious disease physician transitioned you over to oral antibiotics. Please complete the prescription as directed.  Please follow up with Dr. san/janae or your own podiatrist within 1 week of discharge from the hospital, please call [phone] or your own podiatrist for appointment and discuss that you recently were seen in the hospital.  Wound Care: Please apply iodosorb, 4x4 gauze, and natacha daily to R foot 3rd digit wound.   Weight bearing: Please weight bear as tolerated in a surgical shoe.      SECONDARY DISCHARGE DIAGNOSES  Diagnosis: Type 2 diabetes mellitus  Assessment and Plan of Treatment: Follow up with PCP and/or endocrinologist for ongoing medical management of your diabetes. Continue your. Low carb diet. If you had an angiogram with contrast, do not start oral medications until 72 hours post procedure.Continue consistent carbohydrate diet.  Monitor blood glucose levels throughout the day before meals and at bedtime.  Record blood sugars and bring to outpatient providers appointment in order to be reviewed by your doctor for management modifications.  Be aware of diabetes management symptoms including feeling cool and clammy may be related to low glucose levels.  Feeling hot and dry may indicate high glucose levels.  If  you feel these symptoms, check your blood sugar.  Make regular podiatry appointments in order to have feet checked for wounds and toe nails cut by a doctor to prevent infections.    Diagnosis: Cellulitis of leg, right  Assessment and Plan of Treatment: This resolved after receiving IV antibiotics. Call and schedule follow up with outpatient podiatrist in 1 week following discharge for further evaluation.    Diagnosis: Seasonal allergies  Assessment and Plan of Treatment: Continue your medications as directed and please follow-up as an outpatient with your primary care provider for further care and recommendations.

## 2020-06-21 NOTE — DISCHARGE NOTE PROVIDER - CARE PROVIDER_API CALL
Ruddy Hudson  INTERNAL MEDICINE  [address] [place] [id number]  Phone: [phone]  Fax: [phone]  Established Patient  Follow Up Time: 1 week    Deondre Sun (DPM)  Podiatric Medicine and Surgery  [address] [place] [id number]  Phone: [phone]  Fax: [phone]  Established Patient  Follow Up Time: 1 week

## 2020-06-21 NOTE — CONSULT NOTE ADULT - SUBJECTIVE AND OBJECTIVE BOX
Patient is a 74y old  Female who presents with a chief complaint of CC: R toe abscess (20 Jun 2020 21:07)      HPI:    (Covering [facility] ID)    Nelly Monroy is a 74 year old woman with a history of gestational diabetes, seasonal allergies who presents with R 3rd toe abscess.    She first noticed a problem on 6/16 when she believes a mucoid cyst that had been present and ruptured previously became painful, swollen, and started to have purulent discharge.  The pain became severe on 6/17 requiring tramadol and tylenol, but she denies any fevers, chills, or numbness/tingling.  She contacted her podiatrist who was able to see her on Thursday and perform an I&D.  She was then started on augmentin and bactroban.  Although the pain did not significantly worsen after the procedure, the redness and swelling persisted and she had some redness extending up her right shin.  Due to this, she presented to an urgent care and was then directed to [facility] ED for evaluation.    In the ED, she was afebrile with unremarkable vital signs.  Diagnostics revealed an elevated ESR and CRP.  Podiatry evaluated and performed a bedside I&D and recommended abx.  She was started on vanc and zosyn and admitted for further management.    On evaluation, she gave the above history and denied any current pain. (20 Jun 2020 21:00)      REVIEW OF SYSTEMS:    CONSTITUTIONAL: No fever, weight loss, or fatigue  EYES: No eye pain, visual disturbances, or discharge  ENMT:  No sore throat  NECK: No pain or stiffness  RESPIRATORY: No cough, wheezing, chills or hemoptysis; No shortness of breath  CARDIOVASCULAR: No chest pain, palpitations, dizziness, or leg swelling  GASTROINTESTINAL: No abdominal or epigastric pain. No nausea, vomiting, or hematemesis; No diarrhea or constipation. No melena or hematochezia.  GENITOURINARY: No dysuria, frequency, hematuria, or incontinence  NEUROLOGICAL: No headaches, memory loss, loss of strength, numbness, or tremors  SKIN: No itching, burning, rashes, or lesions   LYMPH NODES: No enlarged glands  MUSCULOSKELETAL: No joint pain or swelling; No muscle, back, or extremity pain      PAST MEDICAL & SURGICAL HISTORY:  Gestational diabetes  Enchondroma  History of hip replacement, total      Allergies    cefazolin (Unknown)  clindamycin (Diarrhea)  erythromycin (Unknown)  Keflex (Diarrhea)  Percocet (Unknown)  tetracycline (Unknown)    Intolerances        FAMILY HISTORY:  Family history of CVA      SOCIAL HISTORY:        MEDICATIONS  (STANDING):  cadexomer iodine 0.9% Gel 1 Application(s) Topical daily  enoxaparin Injectable 40 milliGRAM(s) SubCutaneous daily  loratadine 10 milliGRAM(s) Oral daily  piperacillin/tazobactam IVPB.. 3.375 Gram(s) IV Intermittent every 8 hours  vancomycin  IVPB 1250 milliGRAM(s) IV Intermittent every 12 hours    MEDICATIONS  (PRN):  acetaminophen   Tablet .. 650 milliGRAM(s) Oral every 6 hours PRN Mild Pain (1 - 3), Moderate Pain (4 - 6), Severe Pain (7 - 10)  ondansetron Injectable 4 milliGRAM(s) IV Push every 6 hours PRN Nausea      Vital Signs Last 24 Hrs  T(C): 36.8 (21 Jun 2020 05:32), Max: 36.8 (20 Jun 2020 21:38)  T(F): 98.2 (21 Jun 2020 05:32), Max: 98.3 (20 Jun 2020 21:38)  HR: 69 (21 Jun 2020 05:32) (69 - 77)  BP: 140/70 (21 Jun 2020 05:32) (140/70 - 183/90)  BP(mean): --  RR: 18 (21 Jun 2020 05:32) (17 - 18)  SpO2: 95% (21 Jun 2020 05:32) (95% - 100%)    PHYSICAL EXAM:    GENERAL: NAD, well-groomed  HEAD:  Atraumatic, Normocephalic  EYES: EOMI, PERRLA, conjunctiva and sclera clear  ENMT: No tonsillar erythema, exudates, or enlargement; Moist mucous membranes  NECK: Supple, No JVD  CHEST/LUNG: Clear to percussion bilaterally; No rales, rhonchi, wheezing, or rubs  HEART: Regular rate and rhythm; No murmurs, rubs, or gallops  ABDOMEN: Soft, Nontender, Nondistended; Bowel sounds present  EXTREMITIES:  2+ Peripheral Pulses, No clubbing, cyanosis, or edema  LYMPH: No lymphadenopathy noted  SKIN: No rashes or lesions    LABS:  CBC Full  -  ( 21 Jun 2020 05:28 )  WBC Count : 6.27 K/uL  RBC Count : 4.00 M/uL  Hemoglobin : 12.6 g/dL  Hematocrit : 36.7 %  Platelet Count - Automated : 260 K/uL  Mean Cell Volume : 91.8 fL  Mean Cell Hemoglobin : 31.5 pg  Mean Cell Hemoglobin Concentration : 34.3 %  Auto Neutrophil # : x  Auto Lymphocyte # : x  Auto Monocyte # : x  Auto Eosinophil # : x  Auto Basophil # : x  Auto Neutrophil % : x  Auto Lymphocyte % : x  Auto Monocyte % : x  Auto Eosinophil % : x  Auto Basophil % : x      06-21    141  |  105  |  14  ----------------------------<  129<H>  4.3   |  21<L>  |  0.77    Ca    8.7      21 Jun 2020 06:00    TPro  7.0  /  Alb  4.1  /  TBili  0.3  /  DBili  x   /  AST  18  /  ALT  23  /  AlkPhos  98  06-20      LIVER FUNCTIONS - ( 20 Jun 2020 18:44 )  Alb: 4.1 g/dL / Pro: 7.0 g/dL / ALK PHOS: 98 u/L / ALT: 23 u/L / AST: 18 u/L / GGT: x                               MICROBIOLOGY:                    RADIOLOGY: Patient is a 74y old  Female who presents with a chief complaint of CC: R toe abscess (20 Jun 2020 21:07)      HPI:    (Covering [facility] ID)    Nelly Monroy is a 74 year old woman with a history of gestational diabetes, seasonal allergies who presents with R 3rd toe abscess.    She first noticed a problem on 6/16 when she believes a mucoid cyst that had been present and ruptured previously became painful, swollen, and started to have purulent discharge.  The pain became severe on 6/17 requiring tramadol and tylenol, but she denies any fevers, chills, or numbness/tingling.  She contacted her podiatrist who was able to see her on Thursday and perform an I&D.  She was then started on augmentin and bactroban.  Although the pain did not significantly worsen after the procedure, the redness and swelling persisted and she had some redness extending up her right shin.  Due to this, she presented to an urgent care and was then directed to [facility] ED for evaluation.    In the ED, she was afebrile with unremarkable vital signs.  Diagnostics revealed an elevated ESR and CRP.  Podiatry evaluated and performed a bedside I&D and recommended abx.  She was started on vanc and zosyn and admitted for further management.    On evaluation, she gave the above history and denied any current pain. (20 Jun 2020 21:00)    ER pt afebrile.  WBC normal.  ESR 55.  CRP 59.  Covid pcr (-).  MRI foot shows cellulitis without abcess or OM.   ID consulted for further abx management.       REVIEW OF SYSTEMS:    CONSTITUTIONAL: No fever, weight loss, or fatigue  EYES: No eye pain, visual disturbances, or discharge  ENMT:  No sore throat  NECK: No pain or stiffness  RESPIRATORY: No cough, wheezing, chills or hemoptysis; No shortness of breath  CARDIOVASCULAR: No chest pain, palpitations, dizziness, or leg swelling  GASTROINTESTINAL: No abdominal or epigastric pain. No nausea, vomiting, or hematemesis; No diarrhea or constipation. No melena or hematochezia.  GENITOURINARY: No dysuria, frequency, hematuria, or incontinence  NEUROLOGICAL: No headaches, memory loss, loss of strength, numbness, or tremors  SKIN: No itching, burning, rashes, or lesions   LYMPH NODES: No enlarged glands  MUSCULOSKELETAL: No joint pain or swelling; No muscle, back, or extremity pain      PAST MEDICAL & SURGICAL HISTORY:  Gestational diabetes  Enchondroma  History of hip replacement, total      Allergies    cefazolin (Unknown)  clindamycin (Diarrhea)  erythromycin (Unknown)  Keflex (Diarrhea)  Percocet (Unknown)  tetracycline (Unknown)    Intolerances        FAMILY HISTORY:  Family history of CVA      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 3.375 Gram(s) IV Intermittent every 8 hours  vancomycin  IVPB 1250 milliGRAM(s) IV Intermittent every 12 hours    MEDICATIONS  (PRN):  acetaminophen   Tablet .. 650 milliGRAM(s) Oral every 6 hours PRN Mild Pain (1 - 3), Moderate Pain (4 - 6), Severe Pain (7 - 10)  ondansetron Injectable 4 milliGRAM(s) IV Push every 6 hours PRN Nausea      Vital Signs Last 24 Hrs  T(C): 36.8 (21 Jun 2020 05:32), Max: 36.8 (20 Jun 2020 21:38)  T(F): 98.2 (21 Jun 2020 05:32), Max: 98.3 (20 Jun 2020 21:38)  HR: 69 (21 Jun 2020 05:32) (69 - 77)  BP: 140/70 (21 Jun 2020 05:32) (140/70 - 183/90)  BP(mean): --  RR: 18 (21 Jun 2020 05:32) (17 - 18)  SpO2: 95% (21 Jun 2020 05:32) (95% - 100%)    PHYSICAL EXAM:    GENERAL: NAD, well-groomed  HEAD:  Atraumatic, Normocephalic  EYES: EOMI, PERRLA, conjunctiva and sclera clear  ENMT: No tonsillar erythema, exudates, or enlargement; Moist mucous membranes  NECK: Supple, No JVD  CHEST/LUNG: Clear to percussion bilaterally; No rales, rhonchi, wheezing, or rubs  HEART: Regular rate and rhythm; No murmurs, rubs, or gallops  ABDOMEN: Soft, Nontender, Nondistended; Bowel sounds present  EXTREMITIES:  2+ Peripheral Pulses, No clubbing, cyanosis, or edema  LYMPH: No lymphadenopathy noted  SKIN: No rashes or lesions    LABS:  CBC Full  -  ( 21 Jun 2020 05:28 )  WBC Count : 6.27 K/uL  RBC Count : 4.00 M/uL  Hemoglobin : 12.6 g/dL  Hematocrit : 36.7 %  Platelet Count - Automated : 260 K/uL  Mean Cell Volume : 91.8 fL  Mean Cell Hemoglobin : 31.5 pg  Mean Cell Hemoglobin Concentration : 34.3 %  Auto Neutrophil # : x  Auto Lymphocyte # : x  Auto Monocyte # : x  Auto Eosinophil # : x  Auto Basophil # : x  Auto Neutrophil % : x  Auto Lymphocyte % : x  Auto Monocyte % : x  Auto Eosinophil % : x  Auto Basophil % : x      06-21    141  |  105  |  14  ----------------------------<  129<H>  4.3   |  21<L>  |  0.77    Ca    8.7      21 Jun 2020 06:00    TPro  7.0  /  Alb  4.1  /  TBili  0.3  /  DBili  x   /  AST  18  /  ALT  23  /  AlkPhos  98  06-20      LIVER FUNCTIONS - ( 20 Jun 2020 18:44 )  Alb: 4.1 g/dL / Pro: 7.0 g/dL / ALK PHOS: 98 u/L / ALT: 23 u/L / AST: 18 u/L / GGT: x                               MICROBIOLOGY:                    RADIOLOGY:    < from: Xray Toes, Right Foot (06.20.20 @ 20:05) >  FINDINGS:  No acute fracture or dislocation. No cortical erosions or periosteal reaction.  Degenerative changes of the first metatarsophalangeal joint.  Soft tissue swelling of the distal third toe. No tracking subcutaneous emphysema.     IMPRESSION:  No radiographic evidence of osteomyelitis. Correlation with the MRI performed on the same day is recommended.    < end of copied text >        < from: MR Foot w/wo IV Cont, Right (06.20.20 @ 23:36) >  FINDINGS:    There is a soft tissue ulceration/wound at the dorsal aspect of the distal third digit with marked adjacent soft tissue edema and enhancement on postcontrast imaging. There is no soft tissue abscess identified.    There is no T1 signal abnormality or enhancement within the phalanges of the third digit to suggest the presence of osteomyelitis.    There is no fracture identified.    Dorsal subcutaneous soft tissue edema is seen throughout the midfoot and forefoot compatible with cellulitis.    IMPRESSION:Ulceration of the distal third digit with adjacent cellulitis as well as cellulitis throughout the dorsum of the midfoot and forefoot. No soft tissue abscess. No osteomyelitis.    < end of copied text > Patient is a 74y old  Female who presents with a chief complaint of CC: R toe abscess (20 Jun 2020 21:07)      HPI:    (Covering [facility] ID)    Nelly Monroy is a 74 year old woman with a history of gestational diabetes, seasonal allergies who presents with R 3rd toe abscess.    She first noticed a problem on 6/16 when she believes a mucoid cyst that had been present and ruptured previously became painful, swollen, and started to have purulent discharge.  The pain became severe on 6/17 requiring tramadol and tylenol, but she denies any fevers, chills, or numbness/tingling.  She contacted her podiatrist who was able to see her on Thursday and perform an I&D.  She was then started on augmentin and bactroban.  Although the pain did not significantly worsen after the procedure, the redness and swelling persisted and she had some redness extending up her right shin.  Due to this, she presented to an urgent care and was then directed to [facility] ED for evaluation.    In the ED, she was afebrile with unremarkable vital signs.  Diagnostics revealed an elevated ESR and CRP.  Podiatry evaluated and performed a bedside I&D and recommended abx.  She was started on vanc and zosyn and admitted for further management.    On evaluation, she gave the above history and denied any current pain. (20 Jun 2020 21:00)    ER pt afebrile.  WBC normal.  ESR 55.  CRP 59.  Covid pcr (-).  MRI foot shows cellulitis without abcess or OM.   However wound probes to bone as per Podiatry exam, pt planned for possible 3rd ray resection.  ID consulted for further abx management.       REVIEW OF SYSTEMS:    CONSTITUTIONAL: No fever, weight loss, or fatigue  EYES: No eye pain, visual disturbances, or discharge  ENMT:  No sore throat  NECK: No pain or stiffness  RESPIRATORY: No cough, wheezing, chills or hemoptysis; No shortness of breath  CARDIOVASCULAR: No chest pain, palpitations, dizziness, or leg swelling  GASTROINTESTINAL: No abdominal or epigastric pain. No nausea, vomiting, or hematemesis; No diarrhea or constipation. No melena or hematochezia.  GENITOURINARY: No dysuria, frequency, hematuria, or incontinence  NEUROLOGICAL: No headaches, memory loss, loss of strength, numbness, or tremors  SKIN: No itching, burning, rashes, or lesions   LYMPH NODES: No enlarged glands  MUSCULOSKELETAL: No joint pain or swelling; No muscle, back, or extremity pain      PAST MEDICAL & SURGICAL HISTORY:  Gestational diabetes  Enchondroma  History of hip replacement, total      Allergies    cefazolin (Unknown)  clindamycin (Diarrhea)  erythromycin (Unknown)  Keflex (Diarrhea)  Percocet (Unknown)  tetracycline (Unknown)    Intolerances        FAMILY HISTORY:  Family history of CVA      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 3.375 Gram(s) IV Intermittent every 8 hours  vancomycin  IVPB 1250 milliGRAM(s) IV Intermittent every 12 hours    MEDICATIONS  (PRN):  acetaminophen   Tablet .. 650 milliGRAM(s) Oral every 6 hours PRN Mild Pain (1 - 3), Moderate Pain (4 - 6), Severe Pain (7 - 10)  ondansetron Injectable 4 milliGRAM(s) IV Push every 6 hours PRN Nausea      Vital Signs Last 24 Hrs  T(C): 36.8 (21 Jun 2020 05:32), Max: 36.8 (20 Jun 2020 21:38)  T(F): 98.2 (21 Jun 2020 05:32), Max: 98.3 (20 Jun 2020 21:38)  HR: 69 (21 Jun 2020 05:32) (69 - 77)  BP: 140/70 (21 Jun 2020 05:32) (140/70 - 183/90)  BP(mean): --  RR: 18 (21 Jun 2020 05:32) (17 - 18)  SpO2: 95% (21 Jun 2020 05:32) (95% - 100%)    PHYSICAL EXAM:    GENERAL: NAD, well-groomed  HEAD:  Atraumatic, Normocephalic  EYES: EOMI, PERRLA, conjunctiva and sclera clear  ENMT: No tonsillar erythema, exudates, or enlargement; Moist mucous membranes  NECK: Supple, No JVD  CHEST/LUNG: Clear to percussion bilaterally; No rales, rhonchi, wheezing, or rubs  HEART: Regular rate and rhythm; No murmurs, rubs, or gallops  ABDOMEN: Soft, Nontender, Nondistended; Bowel sounds present  EXTREMITIES:  2+ Peripheral Pulses, No clubbing, cyanosis, or edema  LYMPH: No lymphadenopathy noted  SKIN: No rashes or lesions    LABS:  CBC Full  -  ( 21 Jun 2020 05:28 )  WBC Count : 6.27 K/uL  RBC Count : 4.00 M/uL  Hemoglobin : 12.6 g/dL  Hematocrit : 36.7 %  Platelet Count - Automated : 260 K/uL  Mean Cell Volume : 91.8 fL  Mean Cell Hemoglobin : 31.5 pg  Mean Cell Hemoglobin Concentration : 34.3 %  Auto Neutrophil # : x  Auto Lymphocyte # : x  Auto Monocyte # : x  Auto Eosinophil # : x  Auto Basophil # : x  Auto Neutrophil % : x  Auto Lymphocyte % : x  Auto Monocyte % : x  Auto Eosinophil % : x  Auto Basophil % : x      06-21    141  |  105  |  14  ----------------------------<  129<H>  4.3   |  21<L>  |  0.77    Ca    8.7      21 Jun 2020 06:00    TPro  7.0  /  Alb  4.1  /  TBili  0.3  /  DBili  x   /  AST  18  /  ALT  23  /  AlkPhos  98  06-20      LIVER FUNCTIONS - ( 20 Jun 2020 18:44 )  Alb: 4.1 g/dL / Pro: 7.0 g/dL / ALK PHOS: 98 u/L / ALT: 23 u/L / AST: 18 u/L / GGT: x                               MICROBIOLOGY:                    RADIOLOGY:    < from: Xray Toes, Right Foot (06.20.20 @ 20:05) >  FINDINGS:  No acute fracture or dislocation. No cortical erosions or periosteal reaction.  Degenerative changes of the first metatarsophalangeal joint.  Soft tissue swelling of the distal third toe. No tracking subcutaneous emphysema.     IMPRESSION:  No radiographic evidence of osteomyelitis. Correlation with the MRI performed on the same day is recommended.    < end of copied text >        < from: MR Foot w/wo IV Cont, Right (06.20.20 @ 23:36) >  FINDINGS:    There is a soft tissue ulceration/wound at the dorsal aspect of the distal third digit with marked adjacent soft tissue edema and enhancement on postcontrast imaging. There is no soft tissue abscess identified.    There is no T1 signal abnormality or enhancement within the phalanges of the third digit to suggest the presence of osteomyelitis.    There is no fracture identified.    Dorsal subcutaneous soft tissue edema is seen throughout the midfoot and forefoot compatible with cellulitis.    IMPRESSION:Ulceration of the distal third digit with adjacent cellulitis as well as cellulitis throughout the dorsum of the midfoot and forefoot. No soft tissue abscess. No osteomyelitis.    < end of copied text > Patient is a 74y old  Female who presents with a chief complaint of CC: R toe abscess (20 Jun 2020 21:07)      HPI:    (Covering [facility] ID)    Nelly Monroy is a 74 year old woman with a history of gestational diabetes, seasonal allergies who presents with R 3rd toe abscess.    She first noticed a problem on 6/16 when she believes a mucoid cyst that had been present and ruptured previously became painful, swollen, and started to have purulent discharge.  The pain became severe on 6/17 requiring tramadol and tylenol, but she denies any fevers, chills, or numbness/tingling.  She contacted her podiatrist who was able to see her on Thursday and perform an I&D.  She was then started on augmentin and bactroban.  Although the pain did not significantly worsen after the procedure, the redness and swelling persisted and she had some redness extending up her right shin.  Due to this, she presented to an urgent care and was then directed to [facility] ED for evaluation.    In the ED, she was afebrile with unremarkable vital signs.  Diagnostics revealed an elevated ESR and CRP.  Podiatry evaluated and performed a bedside I&D and recommended abx.  She was started on vanc and zosyn and admitted for further management.    On evaluation, she gave the above history and denied any current pain. (20 Jun 2020 21:00)    ER pt afebrile.  WBC normal.  ESR 55.  CRP 59.  Covid pcr (-).  MRI foot shows cellulitis without abcess or OM.   However wound probes to bone as per Podiatry exam, pt planned for possible 3rd ray resection.  ID consulted for further abx management.       REVIEW OF SYSTEMS:    CONSTITUTIONAL: No fever, weight loss, or fatigue  EYES: No eye pain, visual disturbances, or discharge  ENMT:  No sore throat  NECK: No pain or stiffness  RESPIRATORY: No cough, wheezing, chills or hemoptysis; No shortness of breath  CARDIOVASCULAR: No chest pain, palpitations, dizziness, or leg swelling  GASTROINTESTINAL: No abdominal or epigastric pain. No nausea, vomiting, or hematemesis; No diarrhea or constipation. No melena or hematochezia.  GENITOURINARY: No dysuria, frequency, hematuria, or incontinence  NEUROLOGICAL: No headaches, memory loss, loss of strength, numbness, or tremors  SKIN: No itching, burning, rashes, or lesions   LYMPH NODES: No enlarged glands  MUSCULOSKELETAL: No joint pain or swelling; No muscle, back, or extremity pain      PAST MEDICAL & SURGICAL HISTORY:  Gestational diabetes  Enchondroma  History of hip replacement, total      Allergies    cefazolin (Unknown)  clindamycin (Diarrhea)  erythromycin (Unknown)  Keflex (Diarrhea)  Percocet (Unknown)  tetracycline (Unknown)    Intolerances        FAMILY HISTORY:  Family history of CVA      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 3.375 Gram(s) IV Intermittent every 8 hours  vancomycin  IVPB 1250 milliGRAM(s) IV Intermittent every 12 hours    MEDICATIONS  (PRN):  acetaminophen   Tablet .. 650 milliGRAM(s) Oral every 6 hours PRN Mild Pain (1 - 3), Moderate Pain (4 - 6), Severe Pain (7 - 10)  ondansetron Injectable 4 milliGRAM(s) IV Push every 6 hours PRN Nausea      Vital Signs Last 24 Hrs  T(C): 36.8 (21 Jun 2020 05:32), Max: 36.8 (20 Jun 2020 21:38)  T(F): 98.2 (21 Jun 2020 05:32), Max: 98.3 (20 Jun 2020 21:38)  HR: 69 (21 Jun 2020 05:32) (69 - 77)  BP: 140/70 (21 Jun 2020 05:32) (140/70 - 183/90)  BP(mean): --  RR: 18 (21 Jun 2020 05:32) (17 - 18)  SpO2: 95% (21 Jun 2020 05:32) (95% - 100%)    PHYSICAL EXAM:    GENERAL: NAD, well-groomed  HEAD:  Atraumatic, Normocephalic  EYES: EOMI, PERRLA, conjunctiva and sclera clear  ENMT: No tonsillar erythema, exudates, or enlargement; Moist mucous membranes  NECK: Supple, No JVD  CHEST/LUNG: Clear to percussion bilaterally; No rales, rhonchi, wheezing, or rubs  HEART: Regular rate and rhythm; No murmurs, rubs, or gallops  ABDOMEN: Soft, Nontender, Nondistended; Bowel sounds present  EXTREMITIES:  2+ Peripheral Pulses, No clubbing, cyanosis, or edema  LYMPH: No lymphadenopathy noted  SKIN: Rt ft 3rd digit open ulcer with surrounding edema.  Faint erythema up to mid shin    LABS:  CBC Full  -  ( 21 Jun 2020 05:28 )  WBC Count : 6.27 K/uL  RBC Count : 4.00 M/uL  Hemoglobin : 12.6 g/dL  Hematocrit : 36.7 %  Platelet Count - Automated : 260 K/uL  Mean Cell Volume : 91.8 fL  Mean Cell Hemoglobin : 31.5 pg  Mean Cell Hemoglobin Concentration : 34.3 %  Auto Neutrophil # : x  Auto Lymphocyte # : x  Auto Monocyte # : x  Auto Eosinophil # : x  Auto Basophil # : x  Auto Neutrophil % : x  Auto Lymphocyte % : x  Auto Monocyte % : x  Auto Eosinophil % : x  Auto Basophil % : x      06-21    141  |  105  |  14  ----------------------------<  129<H>  4.3   |  21<L>  |  0.77    Ca    8.7      21 Jun 2020 06:00    TPro  7.0  /  Alb  4.1  /  TBili  0.3  /  DBili  x   /  AST  18  /  ALT  23  /  AlkPhos  98  06-20      LIVER FUNCTIONS - ( 20 Jun 2020 18:44 )  Alb: 4.1 g/dL / Pro: 7.0 g/dL / ALK PHOS: 98 u/L / ALT: 23 u/L / AST: 18 u/L / GGT: x                               MICROBIOLOGY:                    RADIOLOGY:    < from: Xray Toes, Right Foot (06.20.20 @ 20:05) >  FINDINGS:  No acute fracture or dislocation. No cortical erosions or periosteal reaction.  Degenerative changes of the first metatarsophalangeal joint.  Soft tissue swelling of the distal third toe. No tracking subcutaneous emphysema.     IMPRESSION:  No radiographic evidence of osteomyelitis. Correlation with the MRI performed on the same day is recommended.    < end of copied text >        < from: MR Foot w/wo IV Cont, Right (06.20.20 @ 23:36) >  FINDINGS:    There is a soft tissue ulceration/wound at the dorsal aspect of the distal third digit with marked adjacent soft tissue edema and enhancement on postcontrast imaging. There is no soft tissue abscess identified.    There is no T1 signal abnormality or enhancement within the phalanges of the third digit to suggest the presence of osteomyelitis.    There is no fracture identified.    Dorsal subcutaneous soft tissue edema is seen throughout the midfoot and forefoot compatible with cellulitis.    IMPRESSION:Ulceration of the distal third digit with adjacent cellulitis as well as cellulitis throughout the dorsum of the midfoot and forefoot. No soft tissue abscess. No osteomyelitis.    < end of copied text >

## 2020-06-21 NOTE — PROGRESS NOTE ADULT - ASSESSMENT
- pt seen and evaluated  - R foot XR and MR neg for subq gas or OM  - R foot dorsal 3rd digit wound to bone w/ cellulitis/ lymphangitis to mid tibia, scant purulence, no malodor, no crepitation   - no acute pod sx intervention   - cont. IV abx  - w/ neg MR for OM/ abscess and significant clinical improvement in cellulitis and swelling of R foot, pod plans for local wound care   - discussed w/ attending

## 2020-06-21 NOTE — DISCHARGE NOTE PROVIDER - NSDCFUADDINST_GEN_ALL_CORE_FT
Podiatry Discharge Instructions:  Follow up: Please follow up with Dr. san/janae within 1 week of discharge from the hospital, please call [phone] for appointment and discuss that you recently were seen in the hospital.  Wound Care: Please apply iodosorb, 4x4 gauze, and natacha daily to R foot 3rd digit wound.   Weight bearing: Please weight bear as tolerated in a surgical shoe.  Antibiotics: Please continue as instructed. Podiatry Discharge Instructions:  Follow up: Please follow up with Dr. san/janae or your own podiatrist within 1 week of discharge from the hospital, please call [phone] or your own podiatrist for appointment and discuss that you recently were seen in the hospital.  Wound Care: Please apply iodosorb, 4x4 gauze, and natacha daily to R foot 3rd digit wound.   Weight bearing: Please weight bear as tolerated in a surgical shoe.  Antibiotics: Please continue as instructed.

## 2020-06-21 NOTE — PROGRESS NOTE ADULT - SUBJECTIVE AND OBJECTIVE BOX
Podiatry pager #: [phone] ([place])/ [phone] ([facility]    Patient is a 74y old  Female who presents with a chief complaint of CC: R toe abscess (21 Jun 2020 11:29)       INTERVAL HPI/OVERNIGHT EVENTS:  Patient seen and evaluated at bedside.  Pt is resting comfortable in NAD. Denies N/V/F/C.     Allergies    cefazolin (Unknown)  clindamycin (Diarrhea)  erythromycin (Unknown)  Keflex (Diarrhea)  Percocet (Unknown)  tetracycline (Unknown)    Intolerances        Vital Signs Last 24 Hrs  T(C): 36.8 (21 Jun 2020 05:32), Max: 36.8 (20 Jun 2020 21:38)  T(F): 98.2 (21 Jun 2020 05:32), Max: 98.3 (20 Jun 2020 21:38)  HR: 69 (21 Jun 2020 05:32) (69 - 77)  BP: 140/70 (21 Jun 2020 05:32) (140/70 - 183/90)  BP(mean): --  RR: 18 (21 Jun 2020 05:32) (17 - 18)  SpO2: 95% (21 Jun 2020 05:32) (95% - 100%)    LABS:                        12.6   6.27  )-----------( 260      ( 21 Jun 2020 05:28 )             36.7     06-21    141  |  105  |  14  ----------------------------<  129<H>  4.3   |  21<L>  |  0.77    Ca    8.7      21 Jun 2020 06:00    TPro  7.0  /  Alb  4.1  /  TBili  0.3  /  DBili  x   /  AST  18  /  ALT  23  /  AlkPhos  98  06-20        CAPILLARY BLOOD GLUCOSE          Lower Extremity Physical Exam:  Vasc: DP/PT 2/4 b/l, TG warm to warm RLE, warm to cool LLE, CFT < 3 sec x 10, edematous R forefoot  Neuro: Protective sensation intact to digits b/l   MSK: no structural abnormalities   Derm:    Wound #1:  Location: RF dorsal 3rd digit   Size: 1.0 cm x 0.5 cm   Etiology: ruptured/infected mucoid cyst   Depth: bone   Wound bed: granular   Drainage: scant purulence   Odor: none   Periwound: cellulitis and edematous     RADIOLOGY & ADDITIONAL TESTS:    < from: MR Foot w/wo IV Cont, Right (06.20.20 @ 23:36) >    INTERPRETATION:    RIGHT FOOT MRI WITH AND WITHOUT CONTRAST    CLINICAL INFORMATION: Third digit wound with cellulitis. Evaluate for abscess and osteomyelitis.  TECHNIQUE: Multiplanar, multisequence MRI was obtained of the right foot with and without contrast. 10 cc of Gadavist was administered intravenously.    FINDINGS:    There is a soft tissue ulceration/wound at the dorsal aspect of the distal third digit with marked adjacent soft tissue edema and enhancement on postcontrast imaging. There is no soft tissue abscess identified.    There is no T1 signal abnormality or enhancement within the phalanges of the third digit to suggest the presence of osteomyelitis.    There is no fracture identified.    Dorsal subcutaneous soft tissue edema is seen throughout the midfoot and forefoot compatible with cellulitis.    IMPRESSION:Ulceration of the distal third digit with adjacent cellulitis as well as cellulitis throughout the dorsum of the midfoot and forefoot. No soft tissue abscess. No osteomyelitis.                  PAPI RUFFIN M.D., ATTENDING RADIOLOGIST  This document has been electronically signed. Jun 21 2020 10:02AM                < end of copied text >

## 2020-06-22 ENCOUNTER — TRANSCRIPTION ENCOUNTER (OUTPATIENT)
Age: 75
End: 2020-06-22

## 2020-06-22 LAB
ANION GAP SERPL CALC-SCNC: 11 MMO/L — SIGNIFICANT CHANGE UP (ref 7–14)
BUN SERPL-MCNC: 12 MG/DL — SIGNIFICANT CHANGE UP (ref 7–23)
CALCIUM SERPL-MCNC: 8.8 MG/DL — SIGNIFICANT CHANGE UP (ref 8.4–10.5)
CHLORIDE SERPL-SCNC: 105 MMOL/L — SIGNIFICANT CHANGE UP (ref 98–107)
CO2 SERPL-SCNC: 23 MMOL/L — SIGNIFICANT CHANGE UP (ref 22–31)
CREAT SERPL-MCNC: 0.85 MG/DL — SIGNIFICANT CHANGE UP (ref 0.5–1.3)
GLUCOSE BLDC GLUCOMTR-MCNC: 115 MG/DL — HIGH (ref 70–99)
GLUCOSE BLDC GLUCOMTR-MCNC: 122 MG/DL — HIGH (ref 70–99)
GLUCOSE BLDC GLUCOMTR-MCNC: 142 MG/DL — HIGH (ref 70–99)
GLUCOSE BLDC GLUCOMTR-MCNC: 151 MG/DL — HIGH (ref 70–99)
GLUCOSE SERPL-MCNC: 141 MG/DL — HIGH (ref 70–99)
HBA1C BLD-MCNC: 6.7 % — HIGH (ref 4–5.6)
HCT VFR BLD CALC: 37.9 % — SIGNIFICANT CHANGE UP (ref 34.5–45)
HGB BLD-MCNC: 12.5 G/DL — SIGNIFICANT CHANGE UP (ref 11.5–15.5)
MAGNESIUM SERPL-MCNC: 2.4 MG/DL — SIGNIFICANT CHANGE UP (ref 1.6–2.6)
MCHC RBC-ENTMCNC: 30.5 PG — SIGNIFICANT CHANGE UP (ref 27–34)
MCHC RBC-ENTMCNC: 33 % — SIGNIFICANT CHANGE UP (ref 32–36)
MCV RBC AUTO: 92.4 FL — SIGNIFICANT CHANGE UP (ref 80–100)
METHOD TYPE: SIGNIFICANT CHANGE UP
NRBC # FLD: 0 K/UL — SIGNIFICANT CHANGE UP (ref 0–0)
PHOSPHATE SERPL-MCNC: 3.1 MG/DL — SIGNIFICANT CHANGE UP (ref 2.5–4.5)
PLATELET # BLD AUTO: 253 K/UL — SIGNIFICANT CHANGE UP (ref 150–400)
PMV BLD: 9.1 FL — SIGNIFICANT CHANGE UP (ref 7–13)
POTASSIUM SERPL-MCNC: 4 MMOL/L — SIGNIFICANT CHANGE UP (ref 3.5–5.3)
POTASSIUM SERPL-SCNC: 4 MMOL/L — SIGNIFICANT CHANGE UP (ref 3.5–5.3)
RBC # BLD: 4.1 M/UL — SIGNIFICANT CHANGE UP (ref 3.8–5.2)
RBC # FLD: 12.6 % — SIGNIFICANT CHANGE UP (ref 10.3–14.5)
SODIUM SERPL-SCNC: 139 MMOL/L — SIGNIFICANT CHANGE UP (ref 135–145)
VANCOMYCIN TROUGH SERPL-MCNC: 16.7 UG/ML — SIGNIFICANT CHANGE UP (ref 10–20)
WBC # BLD: 4.88 K/UL — SIGNIFICANT CHANGE UP (ref 3.8–10.5)
WBC # FLD AUTO: 4.88 K/UL — SIGNIFICANT CHANGE UP (ref 3.8–10.5)

## 2020-06-22 RX ORDER — ISOPROPYL ALCOHOL, BENZOCAINE .7; .06 ML/ML; ML/ML
1 SWAB TOPICAL
Qty: 100 | Refills: 1
Start: 2020-06-22 | End: 2020-08-10

## 2020-06-22 RX ORDER — INSULIN LISPRO 100/ML
VIAL (ML) SUBCUTANEOUS
Refills: 0 | Status: DISCONTINUED | OUTPATIENT
Start: 2020-06-22 | End: 2020-06-23

## 2020-06-22 RX ORDER — DEXTROSE 50 % IN WATER 50 %
15 SYRINGE (ML) INTRAVENOUS ONCE
Refills: 0 | Status: DISCONTINUED | OUTPATIENT
Start: 2020-06-22 | End: 2020-06-23

## 2020-06-22 RX ORDER — DEXTROSE 50 % IN WATER 50 %
25 SYRINGE (ML) INTRAVENOUS ONCE
Refills: 0 | Status: DISCONTINUED | OUTPATIENT
Start: 2020-06-22 | End: 2020-06-23

## 2020-06-22 RX ORDER — GLUCAGON INJECTION, SOLUTION 0.5 MG/.1ML
1 INJECTION, SOLUTION SUBCUTANEOUS ONCE
Refills: 0 | Status: DISCONTINUED | OUTPATIENT
Start: 2020-06-22 | End: 2020-06-23

## 2020-06-22 RX ORDER — DEXTROSE 50 % IN WATER 50 %
12.5 SYRINGE (ML) INTRAVENOUS ONCE
Refills: 0 | Status: DISCONTINUED | OUTPATIENT
Start: 2020-06-22 | End: 2020-06-23

## 2020-06-22 RX ORDER — SODIUM CHLORIDE 9 MG/ML
1000 INJECTION, SOLUTION INTRAVENOUS
Refills: 0 | Status: DISCONTINUED | OUTPATIENT
Start: 2020-06-22 | End: 2020-06-23

## 2020-06-22 RX ORDER — INSULIN LISPRO 100/ML
VIAL (ML) SUBCUTANEOUS AT BEDTIME
Refills: 0 | Status: DISCONTINUED | OUTPATIENT
Start: 2020-06-22 | End: 2020-06-23

## 2020-06-22 RX ADMIN — PIPERACILLIN AND TAZOBACTAM 25 GRAM(S): 4; .5 INJECTION, POWDER, LYOPHILIZED, FOR SOLUTION INTRAVENOUS at 05:41

## 2020-06-22 RX ADMIN — ENOXAPARIN SODIUM 40 MILLIGRAM(S): 100 INJECTION SUBCUTANEOUS at 13:17

## 2020-06-22 RX ADMIN — Medication 166.67 MILLIGRAM(S): at 00:59

## 2020-06-22 RX ADMIN — PIPERACILLIN AND TAZOBACTAM 25 GRAM(S): 4; .5 INJECTION, POWDER, LYOPHILIZED, FOR SOLUTION INTRAVENOUS at 22:47

## 2020-06-22 RX ADMIN — PIPERACILLIN AND TAZOBACTAM 25 GRAM(S): 4; .5 INJECTION, POWDER, LYOPHILIZED, FOR SOLUTION INTRAVENOUS at 15:26

## 2020-06-22 RX ADMIN — Medication 166.67 MILLIGRAM(S): at 13:16

## 2020-06-22 RX ADMIN — LORATADINE 10 MILLIGRAM(S): 10 TABLET ORAL at 21:43

## 2020-06-22 RX ADMIN — Medication 1: at 08:54

## 2020-06-22 NOTE — PROGRESS NOTE ADULT - SUBJECTIVE AND OBJECTIVE BOX
Feels well    Vital Signs Last 24 Hrs  T(C): 36.7 (22 Jun 2020 13:50), Max: 36.8 (22 Jun 2020 05:23)  T(F): 98 (22 Jun 2020 13:50), Max: 98.2 (22 Jun 2020 05:23)  HR: 68 (22 Jun 2020 13:50) (68 - 70)  BP: 149/58 (22 Jun 2020 13:50) (140/65 - 155/72)  BP(mean): --  RR: 18 (22 Jun 2020 13:50) (18 - 19)  SpO2: 100% (22 Jun 2020 13:50) (94% - 100%)    I&O's Summary    06-21-20 @ 07:01  -  06-22-20 @ 07:00  --------------------------------------------------------  IN: 937 mL / OUT: 0 mL / NET: 937 mL      	    GEN-awake, alert, not in acute distress  HEENT-NCAT, EOMI  Neck-supple without JVD  CV-RRR, nl S1/S2, no murmurs  Resp-clear to auscultation bilaterally, normal respiratory effort,   GI-abdomen soft and nontender, no rebound or guarding present  Skin-rt anterior foreleg erythema resolved,  R foot wrapped after I&D and in boot.   Psych-calm, cooperative, normal affect, not attending to internal stimuli  Neuro-A+O x 3, nonfocal    LABS:                        12.5   4.88  )-----------( 253      ( 22 Jun 2020 05:59 )             37.9     06-22    139  |  105  |  12  ----------------------------<  141<H>  4.0   |  23  |  0.85    Ca    8.8      22 Jun 2020 05:59  Phos  3.1     06-22  Mg     2.4     06-22    TPro  7.0  /  Alb  4.1  /  TBili  0.3  /  DBili  x   /  AST  18  /  ALT  23  /  AlkPhos  98  06-20      CAPILLARY BLOOD GLUCOSE      POCT Blood Glucose.: 142 mg/dL (22 Jun 2020 11:56)  POCT Blood Glucose.: 151 mg/dL (22 Jun 2020 08:19)            RADIOLOGY & ADDITIONAL TESTS:    Imaging Personally Reviewed:  [x] YES  [ ] NO    Consultant(s) Notes Reviewed:  [x] YES  [ ] NO

## 2020-06-22 NOTE — PROGRESS NOTE ADULT - SUBJECTIVE AND OBJECTIVE BOX
Podiatry pager #: [phone] ([place])/ [phone] ([facility])    Patient is a 74y old  Female who presents with a chief complaint of CC: R toe abscess (21 Jun 2020 14:00)       INTERVAL HPI/OVERNIGHT EVENTS:  Patient seen and evaluated at bedside.  Pt is resting comfortable in NAD. Denies N/V/F/C.     Allergies    cefazolin (Unknown)  clindamycin (Diarrhea)  erythromycin (Unknown)  Keflex (Diarrhea)  Percocet (Unknown)  tetracycline (Unknown)    Intolerances        Vital Signs Last 24 Hrs  T(C): 36.8 (22 Jun 2020 05:23), Max: 36.8 (22 Jun 2020 05:23)  T(F): 98.2 (22 Jun 2020 05:23), Max: 98.2 (22 Jun 2020 05:23)  HR: 70 (22 Jun 2020 05:23) (69 - 70)  BP: 155/72 (22 Jun 2020 05:23) (140/65 - 155/72)  BP(mean): --  RR: 19 (22 Jun 2020 05:23) (18 - 19)  SpO2: 100% (22 Jun 2020 05:23) (94% - 100%)    LABS:                        12.5   4.88  )-----------( 253      ( 22 Jun 2020 05:59 )             37.9     06-22    139  |  105  |  12  ----------------------------<  141<H>  4.0   |  23  |  0.85    Ca    8.8      22 Jun 2020 05:59  Phos  3.1     06-22  Mg     2.4     06-22    TPro  7.0  /  Alb  4.1  /  TBili  0.3  /  DBili  x   /  AST  18  /  ALT  23  /  AlkPhos  98  06-20        CAPILLARY BLOOD GLUCOSE      POCT Blood Glucose.: 151 mg/dL (22 Jun 2020 08:19)      Lower Extremity Physical Exam:    Vasc: DP/PT 2/4 b/l, TG warm to warm RLE, warm to cool LLE, CFT < 3 sec x 10, edematous R forefoot  Neuro: Protective sensation intact to digits b/l   MSK: no structural abnormalities   Derm:    Wound #1:  Location: RF dorsal 3rd digit   Size: 1.0 cm x 0.5 cm   Etiology: ruptured/infected mucoid cyst   Depth: bone   Wound bed: granular   Drainage: scant purulence   Odor: none   Periwound: cellulitis and edematous

## 2020-06-22 NOTE — PROGRESS NOTE ADULT - ASSESSMENT
74 year old woman with a history of gestational diabetes, seasonal allergies who presents with R 3rd toe abscess.  Podiatry evaluated and performed a bedside I&D and recommended abx.  She was started on vanc and zosyn and admitted for further management.

## 2020-06-22 NOTE — DISCHARGE NOTE NURSING/CASE MANAGEMENT/SOCIAL WORK - PATIENT PORTAL LINK FT
You can access the FollowMyHealth Patient Portal offered by [facility] by registering at the following website: [email or web address]. By joining [facility]’s FollowMyHealth portal, you will also be able to view your health information using other applications (apps) compatible with our system.

## 2020-06-22 NOTE — PROGRESS NOTE ADULT - PROBLEM SELECTOR PLAN 1
mri neg for om  blood cx neg   will follow wound cx -- will tailor once back  cont current antibx  cont local care  hope to switch to oral antibx  viability and healing remain an issue -- given depth and degree of infection  will need aggressive local care and good outpt follow up.  trend biomarkers

## 2020-06-22 NOTE — DISCHARGE NOTE NURSING/CASE MANAGEMENT/SOCIAL WORK - NSDCPNINST_GEN_ALL_CORE
Monitor toe wound for swelling, redness, pus drainage, changed dressing daily. Finish full course of antibiotic. Monitor for fever greater than 100.4 or chills, nausea or vomiting, continue to check fingerstick, and take diabetic medication as ordered.

## 2020-06-22 NOTE — PHARMACOTHERAPY INTERVENTION NOTE - COMMENTS
MIRA ARROYO is a 74y Female with a history of gestational diabetes and seasonal allergies who presents with R 3rd toe abscess. The patient is newly diagnosed with diabetes with an A1c of 6.7%. 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 The patient suggested that with recent events and stresses related to the COVID-19 pandemic, her blood glucose may have risen and led to the new diagnosis of DM. She also verbalized understanding that she will now always have the diagnosis of diabetes and should remain vigilant about blood glucose control. During our discussion, I educated the patient on A1c, risks of uncontrolled hyperglycemia, and hypoglycemia and treatment. I also educated the patient on healthy plate, reading nutritional labels, exercise, and to check blood glucose at different points throughout the day. The patient explained that she does try to maintain a diet high in protein, but does enjoy eating carbs, and that she will occasionally skip lunch leading to large meals for dinner. I explained that it may be best to have a snack around lunch time to minimize hunger around dinner time. Patient verbalized understanding and was receptive to the education provided.    Katrina Mahoney, PharmD  PGY-1 Pharmacy Resident  Spectra [phone]  .

## 2020-06-22 NOTE — PROVIDER CONTACT NOTE (OTHER) - SITUATION
Patient received fourth dose of vancomycin today around 1pm and trough was not drawn. Should I check it now before next dose at 1am?

## 2020-06-22 NOTE — PROGRESS NOTE ADULT - ASSESSMENT
- pt seen and evaluated  - R foot XR and MR neg for subq gas or OM  - R foot dorsal 3rd digit wound to bone w/ cellulitis/ lymphangitis to mid tibia, scant purulence, no malodor, no crepitation   - no acute pod sx intervention   - cont. IV abx  - w/ neg MR for OM/ abscess and significant clinical improvement in cellulitis and swelling of R foot, pod plans for local wound care   - seen w/ attending

## 2020-06-22 NOTE — PROGRESS NOTE ADULT - SUBJECTIVE AND OBJECTIVE BOX
[facility], Division of Infectious Diseases  JHONY Thompson  [phone]    Name: MIRA ARROYO  Age: 74y  Gender: Female  MRN: [id number]    Interval History--  Notes reviewed  states she can move the toe better, no pain.  redness has gone down significantly    Past Medical History--  Gestational diabetes  No pertinent past medical history  Enchondroma  History of hip replacement, total      For details regarding the patient's social history, family history, and other miscellaneous elements, please refer the initial infectious diseases consultation and/or the admitting history and physical examination for this admission.    Allergies    cefazolin (Unknown)  clindamycin (Diarrhea)  erythromycin (Unknown)  Keflex (Diarrhea)  Percocet (Unknown)  tetracycline (Unknown)    Intolerances        Medications--  Antibiotics:  piperacillin/tazobactam IVPB.. 3.375 Gram(s) IV Intermittent every 8 hours  vancomycin  IVPB 1250 milliGRAM(s) IV Intermittent every 12 hours    Immunologic:    Other:  acetaminophen   Tablet .. PRN  cadexomer iodine 0.9% Gel  dextrose 40% Gel PRN  dextrose 5%.  dextrose 50% Injectable  dextrose 50% Injectable  dextrose 50% Injectable  enoxaparin Injectable  glucagon  Injectable PRN  insulin lispro (HumaLOG) corrective regimen sliding scale  insulin lispro (HumaLOG) corrective regimen sliding scale  loratadine  ondansetron Injectable PRN      Review of Systems--  A 10-point review of systems was obtained.     Pertinent positives and negatives--  Constitutional: No fevers. No Chills. No Rigors.   Cardiovascular: No chest pain. No palpitations.  Respiratory: No shortness of breath. No cough.  Gastrointestinal: No nausea or vomiting. No diarrhea or constipation.   Psychiatric: no anxiety    Review of systems otherwise negative except as previously noted.    Physical Examination--  Vital Signs: T(F): 98.2 (06-22-20 @ 05:23), Max: 98.2 (06-22-20 @ 05:23)  HR: 70 (06-22-20 @ 05:23)  BP: 155/72 (06-22-20 @ 05:23)  RR: 19 (06-22-20 @ 05:23)  SpO2: 100% (06-22-20 @ 05:23)  Wt(kg): --  General: Nontoxic-appearing Female in no acute distress.  HEENT: AT/NC. Anicteric. Conjunctiva pink and moist. ir.  Neck: Not rigid. No sense of mass.  Nodes: None palpable.  Lungs: Clear bilaterally without rales, wheezing or rhonchi  Heart: Regular rate and rhythm.   Abdomen: Bowel sounds present and normoactive. Soft. Nondistended. Nontender.  Extremities: No cyanosis or clubbing. No edema. rle wound 3 digit, edema, erythema,   wound clean no purulent discharge.  Skin: Warm. Dry. Good turgor. No rash. No vasculitic stigmata.  Psychiatric: Appropriate affect and mood for situation.         Laboratory Studies--  CBC                        12.5   4.88  )-----------( 253      ( 22 Jun 2020 05:59 )             37.9       Chemistries  06-22    139  |  105  |  12  ----------------------------<  141<H>  4.0   |  23  |  0.85    Ca    8.8      22 Jun 2020 05:59  Phos  3.1     06-22  Mg     2.4     06-22    TPro  7.0  /  Alb  4.1  /  TBili  0.3  /  DBili  x   /  AST  18  /  ALT  23  /  AlkPhos  98  06-20      Culture Data    Culture - Blood (collected 20 Jun 2020 21:18)  Source: .Blood Blood-Peripheral  Preliminary Report (21 Jun 2020 22:01):    No growth to date.    Culture - Blood (collected 20 Jun 2020 21:14)  Source: .Blood Blood-Venous  Preliminary Report (21 Jun 2020 22:01):    No growth to date.            < from: MR Foot w/wo IV Cont, Right (06.20.20 @ 23:36) >    EXAM:  MR FOOT WAW IC RT        PROCEDURE DATE:  Jun 20 2020         INTERPRETATION:    RIGHT FOOT MRI WITH AND WITHOUT CONTRAST    CLINICAL INFORMATION: Third digit wound with cellulitis. Evaluate for abscess and osteomyelitis.  TECHNIQUE: Multiplanar, multisequence MRI was obtained of the right foot with and without contrast. 10 cc of Gadavist was administered intravenously.    FINDINGS:    There is a soft tissue ulceration/wound at the dorsal aspect of the distal third digit with marked adjacent soft tissue edema and enhancement on postcontrast imaging. There is no soft tissue abscess identified.    There is no T1 signal abnormality or enhancement within the phalanges of the third digit to suggest the presence of osteomyelitis.    There is no fracture identified.    Dorsal subcutaneous soft tissue edema is seen throughout the midfoot and forefoot compatible with cellulitis.    IMPRESSION:Ulceration of the distal third digit with adjacent cellulitis as well as cellulitis throughout the dorsum of the midfoot and forefoot. No soft tissue abscess. No osteomyelitis.      < end of copied text >

## 2020-06-22 NOTE — PROGRESS NOTE ADULT - PROBLEM SELECTOR PLAN 1
-Appreciate podiatric intervention and local wound care  -R foot XR and MR neg for subq gas or OM  -ESR/CRP downtrending  -Empiric vanc/zosyn pending culture data  -Blood cxs 6/20 are negative  -Rt toe abscess culture 6/21 grew Strep pyogenes  -Appreciate ID

## 2020-06-23 VITALS — WEIGHT: 200.62 LBS

## 2020-06-23 LAB
ANION GAP SERPL CALC-SCNC: 14 MMO/L — SIGNIFICANT CHANGE UP (ref 7–14)
BASOPHILS # BLD AUTO: 0.04 K/UL — SIGNIFICANT CHANGE UP (ref 0–0.2)
BASOPHILS NFR BLD AUTO: 0.9 % — SIGNIFICANT CHANGE UP (ref 0–2)
BUN SERPL-MCNC: 14 MG/DL — SIGNIFICANT CHANGE UP (ref 7–23)
CALCIUM SERPL-MCNC: 8.9 MG/DL — SIGNIFICANT CHANGE UP (ref 8.4–10.5)
CHLORIDE SERPL-SCNC: 102 MMOL/L — SIGNIFICANT CHANGE UP (ref 98–107)
CO2 SERPL-SCNC: 21 MMOL/L — LOW (ref 22–31)
CREAT SERPL-MCNC: 0.79 MG/DL — SIGNIFICANT CHANGE UP (ref 0.5–1.3)
EOSINOPHIL # BLD AUTO: 0.24 K/UL — SIGNIFICANT CHANGE UP (ref 0–0.5)
EOSINOPHIL NFR BLD AUTO: 5.3 % — SIGNIFICANT CHANGE UP (ref 0–6)
GLUCOSE BLDC GLUCOMTR-MCNC: 120 MG/DL — HIGH (ref 70–99)
GLUCOSE BLDC GLUCOMTR-MCNC: 129 MG/DL — HIGH (ref 70–99)
GLUCOSE SERPL-MCNC: 137 MG/DL — HIGH (ref 70–99)
HCT VFR BLD CALC: 38.1 % — SIGNIFICANT CHANGE UP (ref 34.5–45)
HGB BLD-MCNC: 12.6 G/DL — SIGNIFICANT CHANGE UP (ref 11.5–15.5)
IMM GRANULOCYTES NFR BLD AUTO: 0.4 % — SIGNIFICANT CHANGE UP (ref 0–1.5)
LYMPHOCYTES # BLD AUTO: 1.27 K/UL — SIGNIFICANT CHANGE UP (ref 1–3.3)
LYMPHOCYTES # BLD AUTO: 28.3 % — SIGNIFICANT CHANGE UP (ref 13–44)
MAGNESIUM SERPL-MCNC: 2.3 MG/DL — SIGNIFICANT CHANGE UP (ref 1.6–2.6)
MCHC RBC-ENTMCNC: 30.7 PG — SIGNIFICANT CHANGE UP (ref 27–34)
MCHC RBC-ENTMCNC: 33.1 % — SIGNIFICANT CHANGE UP (ref 32–36)
MCV RBC AUTO: 92.7 FL — SIGNIFICANT CHANGE UP (ref 80–100)
MONOCYTES # BLD AUTO: 0.3 K/UL — SIGNIFICANT CHANGE UP (ref 0–0.9)
MONOCYTES NFR BLD AUTO: 6.7 % — SIGNIFICANT CHANGE UP (ref 2–14)
NEUTROPHILS # BLD AUTO: 2.62 K/UL — SIGNIFICANT CHANGE UP (ref 1.8–7.4)
NEUTROPHILS NFR BLD AUTO: 58.4 % — SIGNIFICANT CHANGE UP (ref 43–77)
NRBC # FLD: 0 K/UL — SIGNIFICANT CHANGE UP (ref 0–0)
PHOSPHATE SERPL-MCNC: 3 MG/DL — SIGNIFICANT CHANGE UP (ref 2.5–4.5)
PLATELET # BLD AUTO: 251 K/UL — SIGNIFICANT CHANGE UP (ref 150–400)
PMV BLD: 9.2 FL — SIGNIFICANT CHANGE UP (ref 7–13)
POTASSIUM SERPL-MCNC: 4 MMOL/L — SIGNIFICANT CHANGE UP (ref 3.5–5.3)
POTASSIUM SERPL-SCNC: 4 MMOL/L — SIGNIFICANT CHANGE UP (ref 3.5–5.3)
RBC # BLD: 4.11 M/UL — SIGNIFICANT CHANGE UP (ref 3.8–5.2)
RBC # FLD: 12.4 % — SIGNIFICANT CHANGE UP (ref 10.3–14.5)
SODIUM SERPL-SCNC: 137 MMOL/L — SIGNIFICANT CHANGE UP (ref 135–145)
WBC # BLD: 4.49 K/UL — SIGNIFICANT CHANGE UP (ref 3.8–10.5)
WBC # FLD AUTO: 4.49 K/UL — SIGNIFICANT CHANGE UP (ref 3.8–10.5)

## 2020-06-23 RX ORDER — ISOPROPYL ALCOHOL, BENZOCAINE .7; .06 ML/ML; ML/ML
1 SWAB TOPICAL
Qty: 100 | Refills: 1
Start: 2020-06-23 | End: 2020-08-11

## 2020-06-23 RX ADMIN — Medication 166.67 MILLIGRAM(S): at 03:01

## 2020-06-23 RX ADMIN — Medication 1 TABLET(S): at 15:36

## 2020-06-23 RX ADMIN — PIPERACILLIN AND TAZOBACTAM 25 GRAM(S): 4; .5 INJECTION, POWDER, LYOPHILIZED, FOR SOLUTION INTRAVENOUS at 07:00

## 2020-06-23 RX ADMIN — ENOXAPARIN SODIUM 40 MILLIGRAM(S): 100 INJECTION SUBCUTANEOUS at 12:51

## 2020-06-23 NOTE — DIETITIAN INITIAL EVALUATION ADULT. - OTHER INFO
74-year-old female patient with PMH of gestational diabetes. Presented to hospital with R third toe cellulitis/abscess and possible osteomyelitis.     Patient seen at bedside for nutrition consult. Patient denies nausea/vomiting/diarrhea/constipation, or issues with chewing/swallowing. Patient with good appetite and PO intake > 75%. Patient confirms current weight of 91 kg (6/21/20). As per HIE, patient with previous weight of 88 kg (12/6/19), indicating a 3 kg weight gain x 6 months. Patient with newly diagnosed DM. She is familiar with diabetic diet, as she has had gestational diabetes during her second pregnancy. Patient interested in diabetes education and weight loss management. RD provided extensive verbal and printed nutrition education related to diabetes management. Discussed carbohydrate food sources, carbohydrate counting, and nutrition label reading.  Discouraged consumption of concentrated sweetened beverages. Offered patient list of [facility] outpatient RD services. Patient agreeable. Obtained email address to send list of services.     RD services to remain available.

## 2020-06-23 NOTE — PROGRESS NOTE ADULT - SUBJECTIVE AND OBJECTIVE BOX
Patient is a 74y old  Female who presents with a chief complaint of CC: R toe abscess (22 Jun 2020 16:18)       INTERVAL HPI/OVERNIGHT EVENTS:  Patient seen and evaluated at bedside.  Pt is resting comfortable in NAD. Denies N/V/F/C.  Pain rated at X/10    Allergies    cefazolin (Unknown)  clindamycin (Diarrhea)  erythromycin (Unknown)  Keflex (Diarrhea)  Percocet (Unknown)  tetracycline (Unknown)    Intolerances        Vital Signs Last 24 Hrs  T(C): 36.8 (23 Jun 2020 05:17), Max: 36.9 (22 Jun 2020 21:09)  T(F): 98.3 (23 Jun 2020 05:17), Max: 98.5 (22 Jun 2020 21:09)  HR: 68 (23 Jun 2020 05:17) (64 - 68)  BP: 154/73 (23 Jun 2020 05:17) (148/72 - 154/73)  BP(mean): --  RR: 18 (23 Jun 2020 05:17) (18 - 18)  SpO2: 97% (23 Jun 2020 05:17) (95% - 100%)    LABS:                        12.6   4.49  )-----------( 251      ( 23 Jun 2020 06:00 )             38.1     06-23    137  |  102  |  14  ----------------------------<  137<H>  4.0   |  21<L>  |  0.79    Ca    8.9      23 Jun 2020 06:00  Phos  3.0     06-23  Mg     2.3     06-23          CAPILLARY BLOOD GLUCOSE      POCT Blood Glucose.: 122 mg/dL (22 Jun 2020 21:42)  POCT Blood Glucose.: 115 mg/dL (22 Jun 2020 17:03)  POCT Blood Glucose.: 142 mg/dL (22 Jun 2020 11:56)      Lower Extremity Physical Exam:    Vasc: DP/PT 2/4 b/l, TG warm to warm RLE, warm to cool LLE, CFT < 3 sec x 10,   Neuro: Protective sensation intact to digits b/l   MSK: no structural abnormalities       Wound #1:  Location: RF dorsal 3rd digit   Size: 1.0 cm x 0.5 cm   Etiology: ruptured/infected mucoid cyst   Depth: bone   Wound bed: granular   Drainage: none  Odor: none   Periwound: improving cellulitis and reduced edema    RADIOLOGY & ADDITIONAL TESTS:

## 2020-06-23 NOTE — DIETITIAN INITIAL EVALUATION ADULT. - PERTINENT LABORATORY DATA
06-23 Na 137 mmol/L Glu 137 mg/dL<H> K+ 4.0 mmol/L Cr 0.79 mg/dL BUN 14 mg/dL Phos 3.0 mg/dL  06-23 @ 11:42 POCT 120 mg/dL  06-23 @ 08:30 POCT 129 mg/dL  06-22 @ 21:42 POCT 122 mg/dL  06-22 @ 17:03 POCT 115 mg/dL

## 2020-06-23 NOTE — PROGRESS NOTE ADULT - REASON FOR ADMISSION
CC: R toe abscess

## 2020-06-23 NOTE — DIETITIAN INITIAL EVALUATION ADULT. - PERTINENT MEDS FT
MEDICATIONS  (STANDING):  amoxicillin  875 milliGRAM(s)/clavulanate 1 Tablet(s) Oral two times a day  cadexomer iodine 0.9% Gel 1 Application(s) Topical daily  dextrose 5%. 1000 milliLiter(s) (50 mL/Hr) IV Continuous <Continuous>  dextrose 50% Injectable 12.5 Gram(s) IV Push once  dextrose 50% Injectable 25 Gram(s) IV Push once  dextrose 50% Injectable 25 Gram(s) IV Push once  enoxaparin Injectable 40 milliGRAM(s) SubCutaneous daily  insulin lispro (HumaLOG) corrective regimen sliding scale   SubCutaneous three times a day before meals  insulin lispro (HumaLOG) corrective regimen sliding scale   SubCutaneous at bedtime  loratadine 10 milliGRAM(s) Oral daily    MEDICATIONS  (PRN):  acetaminophen   Tablet .. 650 milliGRAM(s) Oral every 6 hours PRN Mild Pain (1 - 3), Moderate Pain (4 - 6), Severe Pain (7 - 10)  dextrose 40% Gel 15 Gram(s) Oral once PRN Blood Glucose LESS THAN 70 milliGRAM(s)/deciliter  glucagon  Injectable 1 milliGRAM(s) IntraMuscular once PRN Glucose LESS THAN 70 milligrams/deciliter  ondansetron Injectable 4 milliGRAM(s) IV Push every 6 hours PRN Nausea

## 2020-06-23 NOTE — PROGRESS NOTE ADULT - ASSESSMENT
- pt seen and evaluated  - R foot dorsal 3rd digit wound to bone w/ improving cellulitis, less swelling and no drainage   - no acute pod sx intervention   - cont. IV abx per ID  - w/ neg MR for OM/ abscess and significant clinical improvement in cellulitis and swelling of R foot, pod plans for local wound care   -Podiatric stable for discharge   - seen w/ attending

## 2020-06-23 NOTE — DIETITIAN INITIAL EVALUATION ADULT. - DIET TYPE
Diet, Regular:   Consistent Carbohydrate {Evening Snack} (CSTCHOSN)  DASH/TLC {Sodium & Cholesterol Restricted} (DASH) (06-22-20 @ 08:19)

## 2020-06-23 NOTE — PROGRESS NOTE ADULT - SUBJECTIVE AND OBJECTIVE BOX
[facility], Division of Infectious Diseases  JHONY Thompson  [phone]    Name: MIRA ARROYO  Age: 74y  Gender: Female  MRN: [id number]    Interval History--  Notes reviewed  no new complaints    Past Medical History--  Gestational diabetes  No pertinent past medical history  Enchondroma  History of hip replacement, total      For details regarding the patient's social history, family history, and other miscellaneous elements, please refer the initial infectious diseases consultation and/or the admitting history and physical examination for this admission.    Allergies    cefazolin (Unknown)  clindamycin (Diarrhea)  erythromycin (Unknown)  Keflex (Diarrhea)  Percocet (Unknown)  tetracycline (Unknown)    Intolerances        Medications--  Antibiotics:  amoxicillin  875 milliGRAM(s)/clavulanate 1 Tablet(s) Oral two times a day    Immunologic:    Other:  acetaminophen   Tablet .. PRN  cadexomer iodine 0.9% Gel  dextrose 40% Gel PRN  dextrose 5%.  dextrose 50% Injectable  dextrose 50% Injectable  dextrose 50% Injectable  enoxaparin Injectable  glucagon  Injectable PRN  insulin lispro (HumaLOG) corrective regimen sliding scale  insulin lispro (HumaLOG) corrective regimen sliding scale  loratadine  ondansetron Injectable PRN      Review of Systems--  A 10-point review of systems was obtained.     Pertinent positives and negatives--  Constitutional: No fevers. No Chills. No Rigors.   Cardiovascular: No chest pain. No palpitations.  Respiratory: No shortness of breath. No cough.  Gastrointestinal: No nausea or vomiting. No diarrhea or constipation.   Psychiatric: no depression    Review of systems otherwise negative except as previously noted.    Physical Examination--  Vital Signs: T(F): 98.5 (06-23-20 @ 12:49), Max: 98.5 (06-22-20 @ 21:09)  HR: 70 (06-23-20 @ 12:49)  BP: 151/68 (06-23-20 @ 12:49)  RR: 16 (06-23-20 @ 12:49)  SpO2: 95% (06-23-20 @ 12:49)  Wt(kg): --  General: Nontoxic-appearing Female in no acute distress.  HEENT: AT/NC Anicteric. Conjunctiva pink and moist.  Neck: Not rigid. No sense of mass.  Nodes: None palpable.  Lungs: Clear bilaterally without rales, wheezing or rhonchi  Heart: Regular rate and rhythm.   Abdomen: Bowel sounds present and normoactive. Soft. Nondistended.   Extremities: No cyanosis or clubbing. No edema. rle wrapped  Skin: Warm. Dry. Good turgor. No rash. No vasculitic stigmata.  Psychiatric: Appropriate affect and mood for situation.         Laboratory Studies--  CBC                        12.6   4.49  )-----------( 251      ( 23 Jun 2020 06:00 )             38.1       Chemistries  06-23    137  |  102  |  14  ----------------------------<  137<H>  4.0   |  21<L>  |  0.79    Ca    8.9      23 Jun 2020 06:00  Phos  3.0     06-23  Mg     2.3     06-23        Culture Data    Culture - Abscess with Gram Stain (collected 21 Jun 2020 10:14)  Source: .Abscess right foot  Preliminary Report (23 Jun 2020 06:50):    Few Streptococcus pyogenes (Group A)    Penicillin and ampicillin are drugs of choice for    treatment of beta-hemolytic streptococcal infections.    Testing is not performed routinely because S. pyogenes    (GAS) is universally susceptible to penicillin and    resistance in other strains is extremely rare.    Rare Staphylococcus aureus Susceptibility to follow.  Organism: Streptococcus pyogenes (22 Jun 2020 13:29)  Organism: Streptococcus pyogenes (22 Jun 2020 13:29)    Culture - Blood (collected 20 Jun 2020 21:18)  Source: .Blood Blood-Peripheral  Preliminary Report (21 Jun 2020 22:01):    No growth to date.    Culture - Blood (collected 20 Jun 2020 21:14)  Source: .Blood Blood-Venous  Preliminary Report (21 Jun 2020 22:01):    No growth to date.      < from: MR Foot w/wo IV Cont, Right (06.20.20 @ 23:36) >    EXAM:  MR FOOT WAW IC RT        PROCEDURE DATE:  Jun 20 2020         INTERPRETATION:    RIGHT FOOT MRI WITH AND WITHOUT CONTRAST    CLINICAL INFORMATION: Third digit wound with cellulitis. Evaluate for abscess and osteomyelitis.  TECHNIQUE: Multiplanar, multisequence MRI was obtained of the right foot with and without contrast. 10 cc of Gadavist was administered intravenously.    FINDINGS:    There is a soft tissue ulceration/wound at the dorsal aspect of the distal third digit with marked adjacent soft tissue edema and enhancement on postcontrast imaging. There is no soft tissue abscess identified.    There is no T1 signal abnormality or enhancement within the phalanges of the third digit to suggest the presence of osteomyelitis.    There is no fracture identified.    Dorsal subcutaneous soft tissue edema is seen throughout the midfoot and forefoot compatible with cellulitis.    IMPRESSION:Ulceration of the distal third digit with adjacent cellulitis as well as cellulitis throughout the dorsum of the midfoot and forefoot. No soft tissue abscess. No osteomyelitis.          < end of copied text >

## 2020-06-23 NOTE — DIETITIAN INITIAL EVALUATION ADULT. - ADD RECOMMEND
1) Continue diabetes management with outpatient dietitian and endocrinologist 2) Monitor weights, pertinent labs, BMs, skin integrity, and PO intake 3) Inpatient RD services to remain available

## 2020-06-23 NOTE — PROGRESS NOTE ADULT - PROBLEM SELECTOR PLAN 1
mri neg for om  blood cx neg   wound cx -- strep sensitivities noted  cont current antibx  cont local care per podiatry  hope to switch to oral augementin 875 mg po bid to complete 10 days til 6/30  viability and healing remain an issue -- given depth and degree of infection  will need aggressive local care and good outpt follow up.  trend biomarkers

## 2020-06-24 LAB
-  AMPICILLIN/SULBACTAM: SIGNIFICANT CHANGE UP
-  CEFAZOLIN: SIGNIFICANT CHANGE UP
-  CLINDAMYCIN: SIGNIFICANT CHANGE UP
-  ERYTHROMYCIN: SIGNIFICANT CHANGE UP
-  GENTAMICIN: SIGNIFICANT CHANGE UP
-  OXACILLIN: SIGNIFICANT CHANGE UP
-  PENICILLIN: SIGNIFICANT CHANGE UP
-  RIFAMPIN: SIGNIFICANT CHANGE UP
-  TETRACYCLINE: SIGNIFICANT CHANGE UP
-  TRIMETHOPRIM/SULFAMETHOXAZOLE: SIGNIFICANT CHANGE UP
-  VANCOMYCIN: SIGNIFICANT CHANGE UP
METHOD TYPE: SIGNIFICANT CHANGE UP

## 2020-06-25 LAB
CULTURE RESULTS: SIGNIFICANT CHANGE UP
CULTURE RESULTS: SIGNIFICANT CHANGE UP
SPECIMEN SOURCE: SIGNIFICANT CHANGE UP
SPECIMEN SOURCE: SIGNIFICANT CHANGE UP

## 2020-06-26 LAB
CULTURE RESULTS: SIGNIFICANT CHANGE UP
ORGANISM # SPEC MICROSCOPIC CNT: SIGNIFICANT CHANGE UP
SPECIMEN SOURCE: SIGNIFICANT CHANGE UP

## 2021-08-10 NOTE — ED ADULT NURSE NOTE - CHIEF COMPLAINT QUOTE
[de-identified] : 8/10/21: SR, normal sent in by podiatrist for worsening cellulitis of Right 3rd toe. noted redness and swelling to the affected extremity. Pt is on Augmenting, Bactrim with no improvement, denies fever, chills, COVID exposure. [de-identified] : 12/19 CTA: mild CAD

## 2022-06-23 PROBLEM — Z00.00 ENCOUNTER FOR PREVENTIVE HEALTH EXAMINATION: Status: ACTIVE | Noted: 2022-06-23

## 2022-06-27 ENCOUNTER — APPOINTMENT (OUTPATIENT)
Dept: ORTHOPEDIC SURGERY | Facility: CLINIC | Age: 77
End: 2022-06-27

## 2022-06-27 ENCOUNTER — TRANSCRIPTION ENCOUNTER (OUTPATIENT)
Age: 77
End: 2022-06-27

## 2022-06-27 VITALS
BODY MASS INDEX: 31.76 KG/M2 | HEIGHT: 66.5 IN | OXYGEN SATURATION: 96 % | HEART RATE: 76 BPM | SYSTOLIC BLOOD PRESSURE: 176 MMHG | WEIGHT: 200 LBS | DIASTOLIC BLOOD PRESSURE: 84 MMHG

## 2022-06-27 DIAGNOSIS — S69.92XA UNSPECIFIED INJURY OF LEFT WRIST, HAND AND FINGER(S), INITIAL ENCOUNTER: ICD-10-CM

## 2022-06-27 PROCEDURE — 29130 APPL FINGER SPLINT STATIC: CPT | Mod: FA

## 2022-06-27 PROCEDURE — 73140 X-RAY EXAM OF FINGER(S): CPT | Mod: FA

## 2022-06-27 PROCEDURE — 99204 OFFICE O/P NEW MOD 45 MIN: CPT | Mod: 25

## 2022-06-28 ENCOUNTER — OUTPATIENT (OUTPATIENT)
Dept: OUTPATIENT SERVICES | Facility: HOSPITAL | Age: 77
LOS: 1 days | End: 2022-06-28
Payer: MEDICARE

## 2022-06-28 VITALS
OXYGEN SATURATION: 97 % | HEART RATE: 78 BPM | DIASTOLIC BLOOD PRESSURE: 78 MMHG | RESPIRATION RATE: 18 BRPM | SYSTOLIC BLOOD PRESSURE: 138 MMHG | WEIGHT: 192.02 LBS | HEIGHT: 66 IN | TEMPERATURE: 98 F

## 2022-06-28 DIAGNOSIS — Z98.890 OTHER SPECIFIED POSTPROCEDURAL STATES: Chronic | ICD-10-CM

## 2022-06-28 DIAGNOSIS — Z96.649 PRESENCE OF UNSPECIFIED ARTIFICIAL HIP JOINT: Chronic | ICD-10-CM

## 2022-06-28 DIAGNOSIS — S69.92XA UNSPECIFIED INJURY OF LEFT WRIST, HAND AND FINGER(S), INITIAL ENCOUNTER: ICD-10-CM

## 2022-06-28 DIAGNOSIS — D16.9 BENIGN NEOPLASM OF BONE AND ARTICULAR CARTILAGE, UNSPECIFIED: Chronic | ICD-10-CM

## 2022-06-28 DIAGNOSIS — Z01.818 ENCOUNTER FOR OTHER PREPROCEDURAL EXAMINATION: ICD-10-CM

## 2022-06-28 LAB
ANION GAP SERPL CALC-SCNC: 13 MMOL/L — SIGNIFICANT CHANGE UP (ref 5–17)
BUN SERPL-MCNC: 18 MG/DL — SIGNIFICANT CHANGE UP (ref 7–23)
CALCIUM SERPL-MCNC: 9.2 MG/DL — SIGNIFICANT CHANGE UP (ref 8.4–10.5)
CHLORIDE SERPL-SCNC: 105 MMOL/L — SIGNIFICANT CHANGE UP (ref 96–108)
CO2 SERPL-SCNC: 23 MMOL/L — SIGNIFICANT CHANGE UP (ref 22–31)
CREAT SERPL-MCNC: 0.81 MG/DL — SIGNIFICANT CHANGE UP (ref 0.5–1.3)
EGFR: 76 ML/MIN/1.73M2 — SIGNIFICANT CHANGE UP
GLUCOSE SERPL-MCNC: 196 MG/DL — HIGH (ref 70–99)
HCT VFR BLD CALC: 39.9 % — SIGNIFICANT CHANGE UP (ref 34.5–45)
HGB BLD-MCNC: 13.4 G/DL — SIGNIFICANT CHANGE UP (ref 11.5–15.5)
MCHC RBC-ENTMCNC: 31.4 PG — SIGNIFICANT CHANGE UP (ref 27–34)
MCHC RBC-ENTMCNC: 33.6 GM/DL — SIGNIFICANT CHANGE UP (ref 32–36)
MCV RBC AUTO: 93.4 FL — SIGNIFICANT CHANGE UP (ref 80–100)
NRBC # BLD: 0 /100 WBCS — SIGNIFICANT CHANGE UP (ref 0–0)
PLATELET # BLD AUTO: 241 K/UL — SIGNIFICANT CHANGE UP (ref 150–400)
POTASSIUM SERPL-MCNC: 4.3 MMOL/L — SIGNIFICANT CHANGE UP (ref 3.5–5.3)
POTASSIUM SERPL-SCNC: 4.3 MMOL/L — SIGNIFICANT CHANGE UP (ref 3.5–5.3)
RBC # BLD: 4.27 M/UL — SIGNIFICANT CHANGE UP (ref 3.8–5.2)
RBC # FLD: 13.1 % — SIGNIFICANT CHANGE UP (ref 10.3–14.5)
SODIUM SERPL-SCNC: 141 MMOL/L — SIGNIFICANT CHANGE UP (ref 135–145)
WBC # BLD: 4.99 K/UL — SIGNIFICANT CHANGE UP (ref 3.8–10.5)
WBC # FLD AUTO: 4.99 K/UL — SIGNIFICANT CHANGE UP (ref 3.8–10.5)

## 2022-06-28 PROCEDURE — G0463: CPT

## 2022-06-28 PROCEDURE — U0005: CPT

## 2022-06-28 PROCEDURE — U0003: CPT

## 2022-06-28 PROCEDURE — 80048 BASIC METABOLIC PNL TOTAL CA: CPT

## 2022-06-28 PROCEDURE — C9803: CPT

## 2022-06-28 PROCEDURE — 85027 COMPLETE CBC AUTOMATED: CPT

## 2022-06-28 RX ORDER — SODIUM CHLORIDE 9 MG/ML
1000 INJECTION, SOLUTION INTRAVENOUS
Refills: 0 | Status: DISCONTINUED | OUTPATIENT
Start: 2022-06-30 | End: 2022-07-14

## 2022-06-28 RX ORDER — SODIUM CHLORIDE 9 MG/ML
3 INJECTION INTRAMUSCULAR; INTRAVENOUS; SUBCUTANEOUS EVERY 8 HOURS
Refills: 0 | Status: DISCONTINUED | OUTPATIENT
Start: 2022-06-30 | End: 2022-07-14

## 2022-06-28 NOTE — H&P PST ADULT - NSICDXPASTSURGICALHX_GEN_ALL_CORE_FT
PAST SURGICAL HISTORY:  Enchondroma left humerus 1988 - removed    History of hip replacement, total 2015 right    S/P bunionectomy right 2001

## 2022-06-28 NOTE — H&P PST ADULT - FALL HARM RISK - RISK INTERVENTIONS

## 2022-06-28 NOTE — H&P PST ADULT - NSANTHOSAYNRD_GEN_A_CORE
No. KANG screening performed.  STOP BANG Legend: 0-2 = LOW Risk; 3-4 = INTERMEDIATE Risk; 5-8 = HIGH Risk

## 2022-06-28 NOTE — H&P PST ADULT - NSICDXPASTMEDICALHX_GEN_ALL_CORE_FT
PAST MEDICAL HISTORY:  2019 novel coronavirus disease (COVID-19) 1/2022    Arthritis b/l hands and elbows and knees    Gestational diabetes 1974, 1976

## 2022-06-28 NOTE — H&P PST ADULT - PROBLEM SELECTOR PLAN 1
Left thumb open reduction percutaneous pinning scheduled for 6/30/2022  Presurgical instructions provided including antibacterial wash  Labs collected

## 2022-06-29 ENCOUNTER — TRANSCRIPTION ENCOUNTER (OUTPATIENT)
Age: 77
End: 2022-06-29

## 2022-06-29 RX ORDER — ACETAMINOPHEN AND CODEINE 300; 30 MG/1; MG/1
300-30 TABLET ORAL
Qty: 10 | Refills: 0 | Status: ACTIVE | COMMUNITY
Start: 2022-06-29 | End: 1900-01-01

## 2022-06-30 ENCOUNTER — APPOINTMENT (OUTPATIENT)
Dept: ORTHOPEDIC SURGERY | Facility: HOSPITAL | Age: 77
End: 2022-06-30

## 2022-06-30 ENCOUNTER — TRANSCRIPTION ENCOUNTER (OUTPATIENT)
Age: 77
End: 2022-06-30

## 2022-06-30 ENCOUNTER — OUTPATIENT (OUTPATIENT)
Dept: OUTPATIENT SERVICES | Facility: HOSPITAL | Age: 77
LOS: 1 days | End: 2022-06-30
Payer: MEDICARE

## 2022-06-30 VITALS
SYSTOLIC BLOOD PRESSURE: 153 MMHG | HEART RATE: 69 BPM | WEIGHT: 192.02 LBS | DIASTOLIC BLOOD PRESSURE: 80 MMHG | TEMPERATURE: 97 F | RESPIRATION RATE: 16 BRPM | HEIGHT: 66 IN | OXYGEN SATURATION: 97 %

## 2022-06-30 VITALS
DIASTOLIC BLOOD PRESSURE: 83 MMHG | SYSTOLIC BLOOD PRESSURE: 160 MMHG | HEART RATE: 64 BPM | RESPIRATION RATE: 14 BRPM | OXYGEN SATURATION: 96 % | TEMPERATURE: 98 F

## 2022-06-30 DIAGNOSIS — D16.9 BENIGN NEOPLASM OF BONE AND ARTICULAR CARTILAGE, UNSPECIFIED: Chronic | ICD-10-CM

## 2022-06-30 DIAGNOSIS — S69.92XA UNSPECIFIED INJURY OF LEFT WRIST, HAND AND FINGER(S), INITIAL ENCOUNTER: ICD-10-CM

## 2022-06-30 DIAGNOSIS — Z98.890 OTHER SPECIFIED POSTPROCEDURAL STATES: Chronic | ICD-10-CM

## 2022-06-30 DIAGNOSIS — Z96.649 PRESENCE OF UNSPECIFIED ARTIFICIAL HIP JOINT: Chronic | ICD-10-CM

## 2022-06-30 PROCEDURE — 26765 TREAT FINGER FRACTURE EACH: CPT | Mod: FA

## 2022-06-30 PROCEDURE — C1713: CPT

## 2022-06-30 PROCEDURE — 76000 FLUOROSCOPY <1 HR PHYS/QHP: CPT

## 2022-06-30 PROCEDURE — 11012 DEB SKIN BONE AT FX SITE: CPT | Mod: FA

## 2022-06-30 DEVICE — KWIRE 1.1MMX15.2CM: Type: IMPLANTABLE DEVICE | Site: LEFT | Status: FUNCTIONAL

## 2022-06-30 RX ORDER — VANCOMYCIN HCL 1 G
1250 VIAL (EA) INTRAVENOUS ONCE
Refills: 0 | Status: COMPLETED | OUTPATIENT
Start: 2022-06-30 | End: 2022-06-30

## 2022-06-30 RX ORDER — MELOXICAM 15 MG/1
1 TABLET ORAL
Qty: 0 | Refills: 0 | DISCHARGE

## 2022-06-30 RX ORDER — SODIUM CHLORIDE 9 MG/ML
1000 INJECTION, SOLUTION INTRAVENOUS
Refills: 0 | Status: DISCONTINUED | OUTPATIENT
Start: 2022-06-30 | End: 2022-07-14

## 2022-06-30 RX ORDER — LIDOCAINE HCL 20 MG/ML
0.2 VIAL (ML) INJECTION ONCE
Refills: 0 | Status: COMPLETED | OUTPATIENT
Start: 2022-06-30 | End: 2022-06-30

## 2022-06-30 RX ORDER — LORATADINE 10 MG/1
1 TABLET ORAL
Qty: 0 | Refills: 0 | DISCHARGE

## 2022-06-30 RX ADMIN — SODIUM CHLORIDE 3 MILLILITER(S): 9 INJECTION INTRAMUSCULAR; INTRAVENOUS; SUBCUTANEOUS at 06:43

## 2022-06-30 RX ADMIN — SODIUM CHLORIDE 100 MILLILITER(S): 9 INJECTION, SOLUTION INTRAVENOUS at 06:40

## 2022-06-30 NOTE — PRE-ANESTHESIA EVALUATION ADULT - NSANTHADDINFOFT_GEN_ALL_CORE
In depth discussion regarding patient's multiple allergies/intolerances to medications. No anaphylactic reactions noted. Pt and surgeon agreeable to straight local with minimal sedation if required.

## 2022-06-30 NOTE — ASU PATIENT PROFILE, ADULT - FALL HARM RISK - RISK INTERVENTIONS

## 2022-06-30 NOTE — PRE-ANESTHESIA EVALUATION ADULT - NSANTHPMHFT_GEN_ALL_CORE
75 yo F w/ PMHx of osteoarthritis s/p THR right 2/2015.  While on a cruise to [place] pt accidentally fracture her left thumb with bathroom door, was treated with antibiotics and a finger immobilizer.  Presents today for left thumb open reduction percutaneous pinning.    Denies active CP/SOB/Orthopnea  Denies hx of MI/CHF

## 2022-06-30 NOTE — ASU DISCHARGE PLAN (ADULT/PEDIATRIC) - ASU DC SPECIAL INSTRUCTIONSFT
Please follow Dr. Min's instruction sheet.  Keep dressing clean and dry.  Please call the office to make a follow up appointment.

## 2022-06-30 NOTE — ASU DISCHARGE PLAN (ADULT/PEDIATRIC) - NS MD DC FALL RISK RISK
For information on Fall & Injury Prevention, visit: [email or web address] OR  [email or web address] OR  https://www.cdc.gov/steadi/patient.html

## 2022-06-30 NOTE — ASU DISCHARGE PLAN (ADULT/PEDIATRIC) - CARE PROVIDER_API CALL
Maddy Min)  [address], [facility] [address]  [place] [address]  Phone: [phone]  Fax: [phone]  Follow Up Time:

## 2022-07-08 ENCOUNTER — APPOINTMENT (OUTPATIENT)
Dept: ORTHOPEDIC SURGERY | Facility: CLINIC | Age: 77
End: 2022-07-08

## 2022-07-08 VITALS
WEIGHT: 200 LBS | HEART RATE: 76 BPM | DIASTOLIC BLOOD PRESSURE: 92 MMHG | HEIGHT: 66.5 IN | SYSTOLIC BLOOD PRESSURE: 150 MMHG | OXYGEN SATURATION: 96 % | BODY MASS INDEX: 31.76 KG/M2

## 2022-07-08 DIAGNOSIS — Z98.890 OTHER SPECIFIED POSTPROCEDURAL STATES: ICD-10-CM

## 2022-07-08 PROCEDURE — 99024 POSTOP FOLLOW-UP VISIT: CPT

## 2022-07-08 PROCEDURE — 29130 APPL FINGER SPLINT STATIC: CPT | Mod: 58,FA

## 2022-08-05 ENCOUNTER — APPOINTMENT (OUTPATIENT)
Dept: ORTHOPEDIC SURGERY | Facility: CLINIC | Age: 77
End: 2022-08-05

## 2022-08-05 DIAGNOSIS — S62.522A DISPLACED FRACTURE OF DISTAL PHALANX OF LEFT THUMB, INITIAL ENCOUNTER FOR CLOSED FRACTURE: ICD-10-CM

## 2022-08-05 PROCEDURE — 99024 POSTOP FOLLOW-UP VISIT: CPT

## 2022-08-05 PROCEDURE — 20604 DRAIN/INJ JOINT/BURSA W/US: CPT | Mod: 58,FA

## 2022-08-05 PROCEDURE — 73140 X-RAY EXAM OF FINGER(S): CPT | Mod: FA

## 2024-05-22 NOTE — DISCHARGE NOTE PROVIDER - NSDCCPGOAL_GEN_ALL_CORE_FT
Robby Escobar Petrusek [phone]      Shakeel Delgadillo Anderson - [phone]    To get better and follow your care plan as instructed.

## (undated) DEVICE — SUT VICRYL 3-0 18" SH (POP-OFF)

## (undated) DEVICE — POSITIONER STRAP ARMBOARD VELCRO TS-30

## (undated) DEVICE — VENODYNE/SCD SLEEVE CALF MEDIUM

## (undated) DEVICE — NDL HYPO SAFE 18G X 1.5" (PINK)

## (undated) DEVICE — SUT MONOCRYL 4-0 18" PS-2

## (undated) DEVICE — SYR LUER LOK 10CC

## (undated) DEVICE — PACK HAND TRAY

## (undated) DEVICE — FRAZIER SUCTION TIP 8FR

## (undated) DEVICE — DRSG DERMABOND 0.7ML

## (undated) DEVICE — TOURNIQUET ESMARK 4"

## (undated) DEVICE — DRSG WEBRIL 4"

## (undated) DEVICE — WARMING BLANKET LOWER ADULT

## (undated) DEVICE — GLV 8 PROTEXIS (CREAM) NEU-THERA

## (undated) DEVICE — DRAPE C ARM UNIVERSAL

## (undated) DEVICE — DRSG COBAN 4"

## (undated) DEVICE — YELLOW PIN COVER

## (undated) DEVICE — SOL IRR POUR NS 0.9% 500ML

## (undated) DEVICE — DRSG STERISTRIPS 0.5 X 4"

## (undated) DEVICE — CANISTER DISPOSABLE THIN WALL 3000CC

## (undated) DEVICE — DRSG ACE BANDAGE 2"

## (undated) DEVICE — SUT VICRYL 0 27" CT

## (undated) DEVICE — ELCTR GROUNDING PAD ADULT COVIDIEN